# Patient Record
Sex: FEMALE | Race: WHITE | NOT HISPANIC OR LATINO | Employment: OTHER | URBAN - METROPOLITAN AREA
[De-identification: names, ages, dates, MRNs, and addresses within clinical notes are randomized per-mention and may not be internally consistent; named-entity substitution may affect disease eponyms.]

---

## 2017-01-18 ENCOUNTER — HOSPITAL ENCOUNTER (EMERGENCY)
Facility: MEDICAL CENTER | Age: 67
End: 2017-01-18
Attending: EMERGENCY MEDICINE
Payer: MEDICARE

## 2017-01-18 ENCOUNTER — APPOINTMENT (OUTPATIENT)
Dept: RADIOLOGY | Facility: MEDICAL CENTER | Age: 67
End: 2017-01-18
Attending: EMERGENCY MEDICINE
Payer: MEDICARE

## 2017-01-18 VITALS
RESPIRATION RATE: 16 BRPM | SYSTOLIC BLOOD PRESSURE: 129 MMHG | WEIGHT: 213.85 LBS | BODY MASS INDEX: 39.35 KG/M2 | OXYGEN SATURATION: 92 % | DIASTOLIC BLOOD PRESSURE: 84 MMHG | TEMPERATURE: 98.4 F | HEIGHT: 62 IN | HEART RATE: 92 BPM

## 2017-01-18 DIAGNOSIS — M25.462 EFFUSION OF LEFT KNEE: ICD-10-CM

## 2017-01-18 DIAGNOSIS — R60.9 DEPENDENT EDEMA: ICD-10-CM

## 2017-01-18 DIAGNOSIS — M17.12 PRIMARY OSTEOARTHRITIS OF LEFT KNEE: ICD-10-CM

## 2017-01-18 LAB — GLUCOSE BLD-MCNC: 182 MG/DL (ref 65–99)

## 2017-01-18 PROCEDURE — 99284 EMERGENCY DEPT VISIT MOD MDM: CPT

## 2017-01-18 PROCEDURE — A9270 NON-COVERED ITEM OR SERVICE: HCPCS | Performed by: EMERGENCY MEDICINE

## 2017-01-18 PROCEDURE — 700102 HCHG RX REV CODE 250 W/ 637 OVERRIDE(OP): Performed by: EMERGENCY MEDICINE

## 2017-01-18 PROCEDURE — 82962 GLUCOSE BLOOD TEST: CPT

## 2017-01-18 PROCEDURE — 94640 AIRWAY INHALATION TREATMENT: CPT

## 2017-01-18 PROCEDURE — 93971 EXTREMITY STUDY: CPT

## 2017-01-18 PROCEDURE — 73564 X-RAY EXAM KNEE 4 OR MORE: CPT | Mod: LT

## 2017-01-18 PROCEDURE — 700101 HCHG RX REV CODE 250: Performed by: EMERGENCY MEDICINE

## 2017-01-18 PROCEDURE — 93971 EXTREMITY STUDY: CPT | Mod: 26 | Performed by: SURGERY

## 2017-01-18 RX ORDER — OXYCODONE HYDROCHLORIDE AND ACETAMINOPHEN 5; 325 MG/1; MG/1
1-2 TABLET ORAL EVERY 6 HOURS PRN
Qty: 15 TAB | Refills: 0 | Status: SHIPPED | OUTPATIENT
Start: 2017-01-18 | End: 2017-02-10

## 2017-01-18 RX ORDER — OXYCODONE HYDROCHLORIDE AND ACETAMINOPHEN 5; 325 MG/1; MG/1
1 TABLET ORAL ONCE
Status: COMPLETED | OUTPATIENT
Start: 2017-01-18 | End: 2017-01-18

## 2017-01-18 RX ADMIN — ALBUTEROL SULFATE 2.5 MG: 2.5 SOLUTION RESPIRATORY (INHALATION) at 19:18

## 2017-01-18 RX ADMIN — OXYCODONE AND ACETAMINOPHEN 1 TABLET: 5; 325 TABLET ORAL at 17:15

## 2017-01-18 ASSESSMENT — LIFESTYLE VARIABLES
EVER_SMOKED: YES
DO YOU DRINK ALCOHOL: NO

## 2017-01-18 ASSESSMENT — PAIN SCALES - GENERAL: PAINLEVEL_OUTOF10: 7

## 2017-01-18 ASSESSMENT — COPD QUESTIONNAIRES
DURING THE PAST 4 WEEKS HOW MUCH DID YOU FEEL SHORT OF BREATH: NONE/LITTLE OF THE TIME
HAVE YOU SMOKED AT LEAST 100 CIGARETTES IN YOUR ENTIRE LIFE: YES
COPD SCREENING SCORE: 4
DO YOU EVER COUGH UP ANY MUCUS OR PHLEGM?: NO/ONLY WITH OCCASIONAL COLDS OR INFECTIONS

## 2017-01-18 NOTE — ED AVS SNAPSHOT
1/18/2017          Jessica Cueto  [address] [place] [address]    Dear Jessica:    [facility] wants to ensure your discharge home is safe and you or your loved ones have had all your questions answered regarding your care after you leave the hospital.    You may receive a telephone call within two days of your discharge.  This call is to make certain you understand your discharge instructions as well as ensure we provided you with the best care possible during your stay with us.     The call will only last approximately 3-5 minutes and will be done by a nurse.    Once again, we want to ensure your discharge home is safe and that you have a clear understanding of any next steps in your care.  If you have any questions or concerns, please do not hesitate to contact us, we are here for you.  Thank you for choosing [facility] for your healthcare needs.    Sincerely,    Bridger Childs    [facility]

## 2017-01-18 NOTE — ED NOTES
Pt said that she has not eaten since yesterday and concern about her blood sugar.  fsbs 182, no distress noted

## 2017-01-18 NOTE — ED NOTES
C/o L knee pain began yesterday afternoon, flew from [place] on Tuesday, spoke w her pmd  In [place] and told to come to ed for eval of dvt, history of ' small vessel disease from my knnes on down have had revascularization on R side'

## 2017-01-18 NOTE — ED AVS SNAPSHOT
[employer] Access Code: [email or web address]  Expires: 2/17/2017  9:36 PM    [employer]  A secure, online tool to manage your health information     [employer]’s [employer]® is a secure, online tool that connects you to your personalized health information from the privacy of your home -- day or night - making it very easy for you to manage your healthcare. Once the activation process is completed, you can even access your medical information using the [employer] lara, which is available for free in the Apple Lara store or Google Play store.     [employer] provides the following levels of access (as shown below):   My Chart Features   [facility] Primary Care Doctor [facility]  Specialists [facility]  Urgent  Care Non-[facility]  Primary Care  Doctor   Email your healthcare team securely and privately 24/7 X X X X   Manage appointments: schedule your next appointment; view details of past/upcoming appointments X      Request prescription refills. X      View recent personal medical records, including lab and immunizations X X X X   View health record, including health history, allergies, medications X X X X   Read reports about your outpatient visits, procedures, consult and ER notes X X X X   See your discharge summary, which is a recap of your hospital and/or ER visit that includes your diagnosis, lab results, and care plan. X X       How to register for [employer]:  1. Go to  [email or web address].  2. Click on the Sign Up Now box, which takes you to the New Member Sign Up page. You will need to provide the following information:  a. Enter your [employer] Access Code exactly as it appears at the top of this page. (You will not need to use this code after you’ve completed the sign-up process. If you do not sign up before the expiration date, you must request a new code.)   b. Enter your date of birth.   c. Enter your home email address.   d. Click Submit, and follow the next screen’s instructions.  3. Create a [employer] ID. This will be your [employer]  login ID and cannot be changed, so think of one that is secure and easy to remember.  4. Create a [employer] password. You can change your password at any time.  5. Enter your Password Reset Question and Answer. This can be used at a later time if you forget your password.   6. Enter your e-mail address. This allows you to receive e-mail notifications when new information is available in [employer].  7. Click Sign Up. You can now view your health information.    For assistance activating your [employer] account, call [phone]

## 2017-01-18 NOTE — ED AVS SNAPSHOT
After Visit Summary                                                                                                                Jessica Cueto   MRN: [id number]    Department:  [facility], Emergency Dept   Date of Visit:  1/18/2017            [facility], Emergency Dept    [address]    [place] [address]    Phone:  [phone]      You were seen by     Chadd Sexton M.D.      Your Diagnosis Was     Dependent edema     R60.9       These are the medications you received during your hospitalization from 01/18/2017 1151 to 01/18/2017 2136     Date/Time Order Dose Route Action    01/18/2017 1715 oxycodone-acetaminophen (PERCOCET) 5-325 MG per tablet 1 Tab 1 Tab Oral Given    01/18/2017 1918 albuterol (PROVENTIL) 2.5mg/0.5ml nebulizer solution 2.5 mg 2.5 mg Nebulization Given      Follow-up Information     1. Follow up with José Miguel Hester M.D..    Specialty:  Orthopaedics    Why:  As needed 1-2 weeks    Contact information    [address]  [address]  [facility] [id number] [phone]        Medication Information     Review all of your home medications and newly ordered medications with your primary doctor and/or pharmacist as soon as possible. Follow medication instructions as directed by your doctor and/or pharmacist.     Please keep your complete medication list with you and share with your physician. Update the information when medications are discontinued, doses are changed, or new medications (including over-the-counter products) are added; and carry medication information at all times in the event of emergency situations.               Medication List      ASK your doctor about these medications        Instructions    * albuterol 2.5mg/3ml Nebu solution for nebulization   Commonly known as:  PROVENTIL    Doctor's comments:  25 bullets   3 mL by Nebulization route every 6 hours as needed for Shortness of Breath.   Dose:  2.5 mg       * albuterol 108 (90 BASE) MCG/ACT  Aers inhalation aerosol    Inhale 2 Puffs by mouth every 6 hours as needed for Shortness of Breath.   Dose:  2 Puff       Blood Glucose Monitoring Suppl SUPPLIES Misc    Doctor's comments:  e11.65   Test strips order: Test strips for Accucheck  meter. Sig: use bid and prn ssx high or low sugar #100 RF x 3       Lancets [facility]    Doctor's comments:  e11.65   Lancets order: Lancets for Accucheck meter. Sig: use bid and prn ssx high or low sugar. #100 RF x 3       losartan 50 MG Tabs   Commonly known as:  COZAAR    Take 1 Tab by mouth every day.   Dose:  50 mg       metformin 500 MG Tabs   Commonly known as:  GLUCOPHAGE    Take 1 Tab by mouth 2 times a day, with meals.   Dose:  500 mg       * oxycodone-acetaminophen 5-325 MG Tabs   What changed:  Another medication with the same name was added. Make sure you understand how and when to take each.   Commonly known as:  PERCOCET   Ask about: Which instructions should I use?    Take 1 Tab by mouth every 6 hours as needed.   Dose:  1 Tab       * oxycodone-acetaminophen 5-325 MG Tabs   What changed:  Another medication with the same name was added. Make sure you understand how and when to take each.   Commonly known as:  PERCOCET   Ask about: Which instructions should I use?    Take 1-2 Tabs by mouth every 6 hours as needed.   Dose:  1-2 Tab       * oxycodone-acetaminophen 5-325 MG Tabs   What changed:  You were already taking a medication with the same name, and this prescription was added. Make sure you understand how and when to take each.   Commonly known as:  PERCOCET   Ask about: Which instructions should I use?    Take 1-2 Tabs by mouth every 6 hours as needed (moderate to severe pain).   Dose:  1-2 Tab       * Notice:  This list has 5 medication(s) that are the same as other medications prescribed for you. Read the directions carefully, and ask your doctor or other care provider to review them with you.            Procedures and tests performed during your visit      ACCU-CHEK GLUCOSE    DX-KNEE COMPLETE 4+ LEFT    LE VENOUS DUPLEX (Specify in Comments Left, Right Or Bilateral)        Discharge Instructions       Knee Sprain     Ice the knee 15 minutes four times daily for 2 days.    Take anti inflammatory regularly for 3-5 days (ibuprofen 600 mg every 6 hours or naproxen 400 mg BID) with Prilosec 20 mg a day to prevent gastritis and prescribed medicine as needed. Follow up with ortho if not much better in 1-2 weeks. Return to the ER for any redness, fever, increasing pain, shortness of breath, chest pain or ill appearance. Follow-up with your primary doctor in 1-2 weeks to recheck your oxygenation.    You have a knee sprain. Sprains are painful injuries to the joints. They are a partial or complete tearing of ligaments. Ligaments are tough, fibrous tissues that hold bones together at the joints. A strain (sprain) has occurred when a ligament is stretched or damaged. This injury may take several weeks to heal. This is often the same length of time as a bone fracture (break in bone) takes to heal. Even though a fracture (bone break) may not have occurred, the recovery times may be similar.     HOME CARE INSTRUCTIONS  Ø Rest the injured area as directed. Then slowly start using the joint as directed by your caregiver and as the pain allows. Use crutches as directed.  If the knee was splinted or casted, continue use and care as directed. If an ace bandage has been applied today, it should be removed and reapplied every 3 to 4 hours. It should not be applied tightly, but firmly enough to keep swelling down. Watch toes and feet for swelling, bluish discoloration, coldness, numbness or excessive pain. If any of these symptoms occur, remove the ace bandage and reapply more loosely. If these symptoms persist, seek medical attention.  Ø For the first 24 hours, lie down. Keep the injured extremity elevated on two pillows.  Ø Apply ice to the injured area for fifteen minutes every couple  hours. Do this while awake for the first half day.  Repeat this four times per day for the first 48 hours. Put the ice in a plastic bag and place a towel between the bag of ice and your skin.  Ø Do not apply heat for 48 hours after your injury. Early use of heat will increase swelling and pain. After 48 hours you may use warm packs or warm soaks for 15 to 20 minutes, 2 to 4 times per day. This will give pain relief. It will also help the injury heal faster. Do not sleep with a heating pad as it may cause burns. If you are diabetic, do not use a heating pad unless instructed to do so.  Ø Wear any splinting, casting, or elastic bandage applications as instructed.  Ø Only take over-the-counter or prescription medicines for pain, discomfort, or fever as directed by your caregiver. Do not use aspirin immediately after the injury unless instructed by your caregiver. Aspirin can cause increased bleeding and bruising of the tissues.  Ø If you were given crutches, continue to use them as instructed. Do not resume weight bearing on the affected extremity until instructed.     Persistent pain and inability to use the injured area as directed for more than 2 to 3 days are warning signs. If this happens you should see a caregiver for a follow-up visit as soon as possible. Initially, a hairline fracture (this is the same as a broken bone) may not be evident on x-rays. Persistent pain and swelling indicate that further evaluation, non-weight bearing (use of crutches as instructed), and/or further x-rays are indicated. X-rays may sometimes not show a small fracture until a week or ten days later. Make a follow-up appointment with your own caregiver or one to whom we have referred you. A radiologist (specialist in reading x-rays) may re-read your X-rays. Make sure you know how you are to get your x-ray results. Do not assume everything is normal if you do not hear from us.     SEEK MEDICAL CARE IF:  Ø Bruising, swelling, or pain  increases. Ø You have cold or numb toes.  Ø You have continuing difficulty or pain with walking.      SEEK IMMEDIATE MEDICAL CARE IF:  Ø Your toes are cold, numb or blue.  Ø The pain is not responding to medications and continues to stay the same or get worse.     AGREEMENT BETWEEN PATIENT AND HEALTHCARE TEAM:  Your signature on this document represents an understanding between you and the healthcare team that took care of you today.  That means that you:  Ø Understand these discharge instructions.   Ø Will monitor your condition.  Ø Will seek immediate medical care as instructed.     Document Released: 12/18/2006  Document Re-Released: 06/05/2009  ExitCare® Patient Information ©2009 [employer].            Patient Information     Patient Information    Following emergency treatment: all patient requiring follow-up care must return either to a private physician or a clinic if your condition worsens before you are able to obtain further medical attention, please return to the emergency room.     Billing Information    At [facility], we work to make the billing process streamlined for our patients.  Our Representatives are here to answer any questions you may have regarding your hospital bill.  If you have insurance coverage and have supplied your insurance information to us, we will submit a claim to your insurer on your behalf.  Should you have any questions regarding your bill, we can be reached online or by phone as follows:  Online: You are able pay your bills online or live chat with our representatives about any billing questions you may have. We are here to help Monday - Friday from 8:00am to 7:30pm and 9:00am - 12:00pm on Saturdays.  Please visit [email or web address]  for more information.   Phone:  [phone] or [phone]    Please note that your emergency physician, surgeon, pathologist, radiologist, anesthesiologist, and other specialists are not employed by  [facility] and will therefore bill separately for their services.  Please contact them directly for any questions concerning their bills at the numbers below:     Emergency Physician Services:  [phone]  [facility]:  [phone]  [facility]:  [phone]  [facility]:  [phone]    1. Your final bill may vary from the amount quoted upon discharge if all procedures are not complete at that time, or if your doctor has additional procedures of which we are not aware. You will receive an additional bill if you return to the Emergency Department at [facility] for suture removal regardless of the facility of which the sutures were placed.     2. Please arrange for settlement of this account at the emergency registration.    3. All self-pay accounts are due in full at the time of treatment.  If you are unable to meet this obligation then payment is expected within 4-5 days.     4. If you have had radiology studies (CT, X-ray, Ultrasound, MRI), you have received a preliminary result during your emergency department visit. Please contact the radiology department [phone] to receive a copy of your final result. Please discuss the Final result with your primary physician or with the follow up physician provided.     Crisis Hotline:  [place] Crisis Hotline:  2-973-WGIDMQL or 1-246.916.4586  [place] Crisis Hotline:    1-260.431.3861 or 406-328-5681         ED Discharge Follow Up Questions    1. In order to provide you with very good care, we would like to follow up with a phone call in the next few days.  May we have your permission to contact you?     YES /  NO    2. What is the best phone number to call you? (       )_____-__________    3. What is the best time to call you?      Morning  /  Afternoon  /  Evening                   Patient Signature:  ____________________________________________________________    Date:   ____________________________________________________________

## 2017-01-19 NOTE — ED NOTES
Severe left knee pain after lengthy airplane ride. Instructed to come to ED to be evaluated for DVT

## 2017-01-19 NOTE — ED PROVIDER NOTES
ED Provider Note    CHIEF COMPLAINT  Chief Complaint   Patient presents with   • Knee Pain       HPI  Jessica Cueto is a 66 y.o. female who presents for left leg swelling and pain around the knee onset after a flight from [place] Monday. No prior DVT or PE. Denies chest pain or shortness of breath. No trauma to the knee. She does have peripheral vascular disease in the right leg. History of diabetes. Her swelling is currently improved. Pain is moderately severe. Denies fever or flulike symptoms nausea or body aches. No history of gout. Patient was very active in [place]. She's not had chronic knee pain before.    REVIEW OF SYSTEMS  Pertinent positives include: Left leg pain and swelling after a flight, peripheral vascular disease.  Pertinent negatives include: Headache, chest pain, cough, shortness of breath, vomiting, diarrhea, abdominal pain.  10+ systems reviewed and negative.      PAST MEDICAL HISTORY  Past Medical History   Diagnosis Date   • Diabetes (CMS-HCC)    • Cancer (CMS-HCC) 1995     uterine cancer       FAMILY HISTORY  Family History   Problem Relation Age of Onset   • Diabetes Mother    • Heart Disease Mother    • Heart Disease Father    • Stroke Sister    • Diabetes Brother        SOCIAL HISTORY  Social History   Substance Use Topics   • Smoking status: Former Smoker -- 10 years     Quit date: 01/15/1992   • Smokeless tobacco: Never Used   • Alcohol Use: No     History   Drug Use No       SURGICAL HISTORY  Past Surgical History   Procedure Laterality Date   • Tonsillectomy and adenoidectomy     • Appendectomy     • Cholecystectomy     • Abdominal hysterectomy total     • Stent placement     • Debridement         CURRENT MEDICATIONS  Home Medications     Reviewed by Salomón Fisher R.N. (Registered Nurse) on 01/18/17 at 1642  Med List Status: Partial    Medication Last Dose Status    albuterol (PROVENTIL) 2.5mg/3ml Nebu Soln solution for nebulization Unknown Active    albuterol (VENTOLIN OR  "PROVENTIL) 108 (90 BASE) MCG/ACT Aero Soln inhalation aerosol Unknown Active    Blood Glucose Monitoring Suppl SUPPLIES Misc  Active    Lancets Misc  Active    losartan (COZAAR) 50 MG Tab  Active    metformin (GLUCOPHAGE) 500 MG Tab  Active    oxycodone-acetaminophen (PERCOCET) 5-325 MG Tab has rx at pharmacy Active    oxycodone-acetaminophen (PERCOCET) 5-325 MG Tab  Active                ALLERGIES  Allergies   Allergen Reactions   • Pcn [Penicillins] Anaphylaxis   • Rocephin [Ceftriaxone Sodium-Lidocaine] Anaphylaxis       PHYSICAL EXAM  VITAL SIGNS: /63 mmHg  Pulse 83  Temp(Src) 36.3 °C (97.3 °F)  Resp 18  Ht 1.575 m (5' 2.01\")  Wt 97 kg (213 lb 13.5 oz)  BMI 39.10 kg/m2  SpO2 97%  Reviewed and elevated blood pressure, no hypoxia on room air  Constitutional: Well developed, Well nourished, moderately obese.  HENT: Normocephalic, atraumatic, bilateral external ears normal, oropharynx moist, No exudates or erythema.   Eyes: PERRLA, conjunctiva pink, no scleral icterus.   Cardiovascular: Regular S1-S2 without murmur, rub, gallop. 2+ pitting edema left leg without calf cords or tenderness..  Respiratory: No rales, rhonchi, wheeze.  Gastrointestinal: Soft, nontender, nondistended.  Skin: No erythema, no rash.   Genitourinary:  No costovertebral angle tenderness.   Neurologic: Alert & oriented x 3, cranial nerves 2-12 intact by passive exam.  No focal deficit noted.  Psychiatric: Affect normal, Judgment normal, Mood normal.   Musculoskeletal: Generalized tenderness left knee without erythema or warmth. Patellar and medial and lateral joint line tenderness. Positive effusion.    DIFFERENTIAL DIAGNOSIS:  DVT, arthritis, gout, septic joint, occult fracture.      RADIOLOGY/PROCEDURES  DX-KNEE COMPLETE 4+ LEFT   Final Result         1. Severe tricompartmental osteoarthritis with large knee joint effusion.      LE VENOUS DUPLEX (Specify in Comments Left, Right Or Bilateral)   Final Result    "       LABORATORY:  Results for orders placed or performed during the hospital encounter of 01/18/17   ACCU-CHEK GLUCOSE   Result Value Ref Range    Glucose - Accu-Ck 182 (H) 65 - 99 mg/dL       INTERVENTIONS:  Medications   oxycodone-acetaminophen (PERCOCET) 5-325 MG per tablet 1 Tab (1 Tab Oral Given 1/18/17 1715)   albuterol (PROVENTIL) 2.5mg/0.5ml nebulizer solution 2.5 mg (2.5 mg Nebulization Given 1/18/17 1918)     Response: Patient developed borderline hypoxia which improved to 92-93% after albuterol. History of asthma on albuterol. Minimal improvement in pain.    COURSE & MEDICAL DECISION MAKING  This patient presents with left leg pain specifically in the left knee with effusion and dependent edema. There is no DVT despite her recent flight. She has severe osteoarthritis with an effusion likely due to her recent increase in use. She has no history of gout and clinically no symptoms or exam findings suggestive of septic joint. At this point I do not believe arthrocentesis would change treatment. Patient also had borderline hypoxia and a history of asthma but no suggestive of pneumonia or respiratory infection. She is not wheezing at this time. Her oxygenation improved with albuterol..    PLAN:  Discharge Medication List as of 1/18/2017  9:36 PM      START taking these medications    Details   !! oxycodone-acetaminophen (PERCOCET) 5-325 MG Tab Take 1-2 Tabs by mouth every 6 hours as needed (moderate to severe pain)., Disp-15 Tab, R-0, Print Rx Paper       !! - Potential duplicate medications found. Please discuss with provider.        Prescription monitoring accessed  Return for fever or redness or ill appearance  Take albuterol twice a day  Follow-up primary physician 2 weeks for reassessment of oxygenation    José Miguel Hester M.D.  [address]  [address]  [facility] [address]  [phone]      As needed 1-2 weeks      CONDITION: Stable.    FINAL IMPRESSION  1. Dependent edema    2. Effusion of left knee    3.  Primary osteoarthritis of left knee    4.      History of asthma with mild hypoxia, improved      Electronically signed by: Chadd Sexton, 1/18/2017 4:45 PM

## 2017-01-19 NOTE — ED NOTES
Pulse ox moved to ear due to patient having acrylic nails. Patient O2 stat at 88% on room air after breathing treatment from RT. 2L applied via NC, patient O2 stat slowly climbing into the mid 90s

## 2017-01-19 NOTE — DISCHARGE INSTRUCTIONS
Knee Sprain     Ice the knee 15 minutes four times daily for 2 days.    Take anti inflammatory regularly for 3-5 days (ibuprofen 600 mg every 6 hours or naproxen 400 mg BID) with Prilosec 20 mg a day to prevent gastritis and prescribed medicine as needed. Follow up with ortho if not much better in 1-2 weeks. Return to the ER for any redness, fever, increasing pain, shortness of breath, chest pain or ill appearance. Follow-up with your primary doctor in 1-2 weeks to recheck your oxygenation.    You have a knee sprain. Sprains are painful injuries to the joints. They are a partial or complete tearing of ligaments. Ligaments are tough, fibrous tissues that hold bones together at the joints. A strain (sprain) has occurred when a ligament is stretched or damaged. This injury may take several weeks to heal. This is often the same length of time as a bone fracture (break in bone) takes to heal. Even though a fracture (bone break) may not have occurred, the recovery times may be similar.     HOME CARE INSTRUCTIONS  Ø Rest the injured area as directed. Then slowly start using the joint as directed by your caregiver and as the pain allows. Use crutches as directed.  If the knee was splinted or casted, continue use and care as directed. If an ace bandage has been applied today, it should be removed and reapplied every 3 to 4 hours. It should not be applied tightly, but firmly enough to keep swelling down. Watch toes and feet for swelling, bluish discoloration, coldness, numbness or excessive pain. If any of these symptoms occur, remove the ace bandage and reapply more loosely. If these symptoms persist, seek medical attention.  Ø For the first 24 hours, lie down. Keep the injured extremity elevated on two pillows.  Ø Apply ice to the injured area for fifteen minutes every couple hours. Do this while awake for the first half day.  Repeat this four times per day for the first 48 hours. Put the ice in a plastic bag and place a  towel between the bag of ice and your skin.  Ø Do not apply heat for 48 hours after your injury. Early use of heat will increase swelling and pain. After 48 hours you may use warm packs or warm soaks for 15 to 20 minutes, 2 to 4 times per day. This will give pain relief. It will also help the injury heal faster. Do not sleep with a heating pad as it may cause burns. If you are diabetic, do not use a heating pad unless instructed to do so.  Ø Wear any splinting, casting, or elastic bandage applications as instructed.  Ø Only take over-the-counter or prescription medicines for pain, discomfort, or fever as directed by your caregiver. Do not use aspirin immediately after the injury unless instructed by your caregiver. Aspirin can cause increased bleeding and bruising of the tissues.  Ø If you were given crutches, continue to use them as instructed. Do not resume weight bearing on the affected extremity until instructed.     Persistent pain and inability to use the injured area as directed for more than 2 to 3 days are warning signs. If this happens you should see a caregiver for a follow-up visit as soon as possible. Initially, a hairline fracture (this is the same as a broken bone) may not be evident on x-rays. Persistent pain and swelling indicate that further evaluation, non-weight bearing (use of crutches as instructed), and/or further x-rays are indicated. X-rays may sometimes not show a small fracture until a week or ten days later. Make a follow-up appointment with your own caregiver or one to whom we have referred you. A radiologist (specialist in reading x-rays) may re-read your X-rays. Make sure you know how you are to get your x-ray results. Do not assume everything is normal if you do not hear from us.     SEEK MEDICAL CARE IF:  Ø Bruising, swelling, or pain increases. Ø You have cold or numb toes.  Ø You have continuing difficulty or pain with walking.      SEEK IMMEDIATE MEDICAL CARE IF:  Ø Your toes are  cold, numb or blue.  Ø The pain is not responding to medications and continues to stay the same or get worse.     AGREEMENT BETWEEN PATIENT AND HEALTHCARE TEAM:  Your signature on this document represents an understanding between you and the healthcare team that took care of you today.  That means that you:  Ø Understand these discharge instructions.   Ø Will monitor your condition.  Ø Will seek immediate medical care as instructed.     Document Released: 12/18/2006  Document Re-Released: 06/05/2009  [employer]® Patient Information ©2009 [employer].

## 2017-01-19 NOTE — ED NOTES
Jessica JUSTIN Nory discharged via ambulation with daughter driving home.  Discharge instructions given and reviewed, patient educated to follow up with ED and/or ortho if worsening, verbalized understanding.  Prescriptions given x 1.  All personal belongings in possession.  No questions at this time.

## 2017-02-10 ENCOUNTER — APPOINTMENT (OUTPATIENT)
Dept: MEDICAL GROUP | Facility: MEDICAL CENTER | Age: 67
End: 2017-02-10
Payer: MEDICARE

## 2017-02-10 ENCOUNTER — APPOINTMENT (OUTPATIENT)
Dept: RADIOLOGY | Facility: MEDICAL CENTER | Age: 67
End: 2017-02-10
Attending: EMERGENCY MEDICINE
Payer: MEDICARE

## 2017-02-10 ENCOUNTER — HOSPITAL ENCOUNTER (EMERGENCY)
Facility: MEDICAL CENTER | Age: 67
End: 2017-02-10
Attending: EMERGENCY MEDICINE
Payer: MEDICARE

## 2017-02-10 VITALS
TEMPERATURE: 100 F | BODY MASS INDEX: 38.46 KG/M2 | OXYGEN SATURATION: 92 % | HEART RATE: 86 BPM | DIASTOLIC BLOOD PRESSURE: 81 MMHG | HEIGHT: 62 IN | WEIGHT: 209 LBS | SYSTOLIC BLOOD PRESSURE: 149 MMHG | RESPIRATION RATE: 16 BRPM

## 2017-02-10 DIAGNOSIS — J40 BRONCHITIS: ICD-10-CM

## 2017-02-10 DIAGNOSIS — E11.9 TYPE 2 DIABETES MELLITUS WITHOUT COMPLICATION, WITHOUT LONG-TERM CURRENT USE OF INSULIN (HCC): ICD-10-CM

## 2017-02-10 LAB
ALBUMIN SERPL BCP-MCNC: 4 G/DL (ref 3.2–4.9)
ALBUMIN/GLOB SERPL: 1.1 G/DL
ALP SERPL-CCNC: 77 U/L (ref 30–99)
ALT SERPL-CCNC: 12 U/L (ref 2–50)
ANION GAP SERPL CALC-SCNC: 11 MMOL/L (ref 0–11.9)
AST SERPL-CCNC: 15 U/L (ref 12–45)
BASOPHILS # BLD AUTO: 0.7 % (ref 0–1.8)
BASOPHILS # BLD: 0.05 K/UL (ref 0–0.12)
BILIRUB SERPL-MCNC: 1.8 MG/DL (ref 0.1–1.5)
BNP SERPL-MCNC: 40 PG/ML (ref 0–100)
BUN SERPL-MCNC: 18 MG/DL (ref 8–22)
CALCIUM SERPL-MCNC: 9 MG/DL (ref 8.5–10.5)
CHLORIDE SERPL-SCNC: 104 MMOL/L (ref 96–112)
CO2 SERPL-SCNC: 23 MMOL/L (ref 20–33)
CREAT SERPL-MCNC: 0.99 MG/DL (ref 0.5–1.4)
EOSINOPHIL # BLD AUTO: 0.04 K/UL (ref 0–0.51)
EOSINOPHIL NFR BLD: 0.6 % (ref 0–6.9)
ERYTHROCYTE [DISTWIDTH] IN BLOOD BY AUTOMATED COUNT: 42.5 FL (ref 35.9–50)
GFR SERPL CREATININE-BSD FRML MDRD: 56 ML/MIN/1.73 M 2
GLOBULIN SER CALC-MCNC: 3.5 G/DL (ref 1.9–3.5)
GLUCOSE BLD-MCNC: 165 MG/DL (ref 65–99)
GLUCOSE SERPL-MCNC: 180 MG/DL (ref 65–99)
HCT VFR BLD AUTO: 46.6 % (ref 37–47)
HGB BLD-MCNC: 15.2 G/DL (ref 12–16)
IMM GRANULOCYTES # BLD AUTO: 0.03 K/UL (ref 0–0.11)
IMM GRANULOCYTES NFR BLD AUTO: 0.4 % (ref 0–0.9)
LYMPHOCYTES # BLD AUTO: 0.85 K/UL (ref 1–4.8)
LYMPHOCYTES NFR BLD: 12.6 % (ref 22–41)
MCH RBC QN AUTO: 29.7 PG (ref 27–33)
MCHC RBC AUTO-ENTMCNC: 32.6 G/DL (ref 33.6–35)
MCV RBC AUTO: 91 FL (ref 81.4–97.8)
MONOCYTES # BLD AUTO: 0.49 K/UL (ref 0–0.85)
MONOCYTES NFR BLD AUTO: 7.3 % (ref 0–13.4)
NEUTROPHILS # BLD AUTO: 5.26 K/UL (ref 2–7.15)
NEUTROPHILS NFR BLD: 78.4 % (ref 44–72)
NRBC # BLD AUTO: 0 K/UL
NRBC BLD AUTO-RTO: 0 /100 WBC
PLATELET # BLD AUTO: 250 K/UL (ref 164–446)
PMV BLD AUTO: 12.2 FL (ref 9–12.9)
POTASSIUM SERPL-SCNC: 3.9 MMOL/L (ref 3.6–5.5)
PROT SERPL-MCNC: 7.5 G/DL (ref 6–8.2)
RBC # BLD AUTO: 5.12 M/UL (ref 4.2–5.4)
SODIUM SERPL-SCNC: 138 MMOL/L (ref 135–145)
WBC # BLD AUTO: 6.7 K/UL (ref 4.8–10.8)

## 2017-02-10 PROCEDURE — 80053 COMPREHEN METABOLIC PANEL: CPT

## 2017-02-10 PROCEDURE — 99284 EMERGENCY DEPT VISIT MOD MDM: CPT

## 2017-02-10 PROCEDURE — 83880 ASSAY OF NATRIURETIC PEPTIDE: CPT

## 2017-02-10 PROCEDURE — 87040 BLOOD CULTURE FOR BACTERIA: CPT

## 2017-02-10 PROCEDURE — 71010 DX-CHEST-LIMITED (1 VIEW): CPT

## 2017-02-10 PROCEDURE — 87077 CULTURE AEROBIC IDENTIFY: CPT

## 2017-02-10 PROCEDURE — 85025 COMPLETE CBC W/AUTO DIFF WBC: CPT

## 2017-02-10 PROCEDURE — 82962 GLUCOSE BLOOD TEST: CPT

## 2017-02-10 RX ORDER — ALBUTEROL SULFATE 2.5 MG/3ML
2.5 SOLUTION RESPIRATORY (INHALATION) EVERY 4 HOURS PRN
Qty: 75 ML | Refills: 0 | Status: SHIPPED | OUTPATIENT
Start: 2017-02-10 | End: 2017-10-02

## 2017-02-10 RX ORDER — BENZONATATE 100 MG/1
200 CAPSULE ORAL 3 TIMES DAILY PRN
Qty: 60 CAP | Refills: 0 | Status: SHIPPED | OUTPATIENT
Start: 2017-02-10 | End: 2017-09-26

## 2017-02-10 RX ORDER — AZITHROMYCIN 250 MG/1
TABLET, FILM COATED ORAL
Qty: 6 TAB | Refills: 0 | Status: SHIPPED | OUTPATIENT
Start: 2017-02-10 | End: 2017-07-18

## 2017-02-10 ASSESSMENT — PAIN SCALES - GENERAL: PAINLEVEL_OUTOF10: 3

## 2017-02-10 ASSESSMENT — LIFESTYLE VARIABLES: DO YOU DRINK ALCOHOL: NO

## 2017-02-10 NOTE — ED AVS SNAPSHOT
Home Care Instructions                                                                                                                Jessica Cueto   MRN: [id number]    Department:  [facility], Emergency Dept   Date of Visit:  2/10/2017            [facility], Emergency Dept    [address]    [facility] [address]    Phone:  [phone]      You were seen by     Guy G Gansert, M.D.      Your Diagnosis Was     Bronchitis     J40       Follow-up Information     1. Follow up with Don Chatterjee M.D. In 2 days.    Specialty:  Internal Medicine    Contact information    [address]  [address]  [facility] [address] [phone]        Medication Information     Review all of your home medications and newly ordered medications with your primary doctor and/or pharmacist as soon as possible. Follow medication instructions as directed by your doctor and/or pharmacist.     Please keep your complete medication list with you and share with your physician. Update the information when medications are discontinued, doses are changed, or new medications (including over-the-counter products) are added; and carry medication information at all times in the event of emergency situations.               Medication List      START taking these medications        Instructions    azithromycin 250 MG Tabs   Commonly known as:  ZITHROMAX    Take two tabs by mouth on day one, then one tab by mouth daily on days 2-5.       Hydrocod Polst-CPM Polst ER 10-8 MG/5ML Suer   Commonly known as:  TUSSIONEX PENNKINETIC ER    Take 5 mL by mouth every 12 hours as needed for Cough.   Dose:  5 mL         ASK your doctor about these medications        Instructions    * albuterol 2.5mg/3ml Nebu solution for nebulization   What changed:  Another medication with the same name was added. Make sure you understand how and when to take each.   Commonly known as:  PROVENTIL   Ask about: Which instructions should I use?    Doctor's comments:  25 bullets   3 mL by Nebulization route every 6 hours as needed for Shortness of Breath.   Dose:  2.5 mg       * albuterol 108 (90 BASE) MCG/ACT Aers inhalation aerosol   What changed:  Another medication with the same name was added. Make sure you understand how and when to take each.   Ask about: Which instructions should I use?    Inhale 2 Puffs by mouth every 6 hours as needed for Shortness of Breath.   Dose:  2 Puff       * albuterol 2.5mg/3ml Nebu solution for nebulization   What changed:  You were already taking a medication with the same name, and this prescription was added. Make sure you understand how and when to take each.   Commonly known as:  PROVENTIL   Ask about: Which instructions should I use?    3 mL by Nebulization route every four hours as needed for Shortness of Breath.   Dose:  2.5 mg       Blood Glucose Monitoring Suppl SUPPLIES Misc    Doctor's comments:  e11.65   Test strips order: Test strips for Accucheck  meter. Sig: use bid and prn ssx high or low sugar #100 RF x 3       Lancets [facility]    Doctor's comments:  e11.65   Lancets order: Lancets for Accucheck meter. Sig: use bid and prn ssx high or low sugar. #100 RF x 3       losartan 50 MG Tabs   Commonly known as:  COZAAR    Take 1 Tab by mouth every day.   Dose:  50 mg       metformin 500 MG Tabs   Commonly known as:  GLUCOPHAGE    Take 1 Tab by mouth 2 times a day, with meals.   Dose:  500 mg       * Notice:  This list has 3 medication(s) that are the same as other medications prescribed for you. Read the directions carefully, and ask your doctor or other care provider to review them with you.            Procedures and tests performed during your visit     ACCU-CHEK GLUCOSE    BLOOD CULTURE    BTYPE NATRIURETIC PEPTIDE    CBC WITH DIFFERENTIAL    COMP METABOLIC PANEL    DX-CHEST-LIMITED (1 VIEW)    ESTIMATED GFR        Discharge Instructions       Bronchitis  Bronchitis is a problem of the air tubes leading to your  lungs. This problem makes it hard for air to get in and out of the lungs. You may cough a lot because your air tubes are narrow. Going without care can cause lasting (chronic) bronchitis.  HOME CARE   · Drink enough fluids to keep your pee (urine) clear or pale yellow.  · Use a cool mist humidifier.  · Quit smoking if you smoke. If you keep smoking, the bronchitis might not get better.  · Only take medicine as told by your doctor.  GET HELP RIGHT AWAY IF:   · Coughing keeps you awake.  · You start to wheeze.  · You become more sick or weak.  · You have a hard time breathing or get short of breath.  · You cough up blood.  · Coughing lasts more than 2 weeks.  · You have a fever.  · Your baby is older than 3 months with a rectal temperature of 102° F (38.9° C) or higher.  · Your baby is 3 months old or younger with a rectal temperature of 100.4° F (38° C) or higher.  MAKE SURE YOU:  · Understand these instructions.  · Will watch your condition.  · Will get help right away if you are not doing well or get worse.  Document Released: 06/05/2009 Document Revised: 03/11/2013 Document Reviewed: 11/19/2010  [employer]® Patient Information ©2014 [employer].    Blood Glucose Monitoring, Adult  Monitoring your blood glucose (also know as blood sugar) helps you to manage your diabetes. It also helps you and your health care provider monitor your diabetes and determine how well your treatment plan is working.  WHY SHOULD YOU MONITOR YOUR BLOOD GLUCOSE?  · It can help you understand how food, exercise, and medicine affect your blood glucose.  · It allows you to know what your blood glucose is at any given moment. You can quickly tell if you are having low blood glucose (hypoglycemia) or high blood glucose (hyperglycemia).  · It can help you and your health care provider know how to adjust your medicines.  · It can help you understand how to manage an illness or adjust medicine for exercise.  WHEN SHOULD YOU TEST?  Your health care  provider will help you decide how often you should check your blood glucose. This may depend on the type of diabetes you have, your diabetes control, or the types of medicines you are taking. Be sure to write down all of your blood glucose readings so that this information can be reviewed with your health care provider. See below for examples of testing times that your health care provider may suggest.  Type 1 Diabetes  · Test at least 2 times per day if your diabetes is well controlled, if you are using an insulin pump, or if you perform multiple daily injections.  · If your diabetes is not well controlled or if you are sick, you may need to test more often.  · It is a good idea to also test:  ¨ Before every insulin injection.  ¨ Before and after exercise.  ¨ Between meals and 2 hours after a meal.  ¨ Occasionally between 2:00 a.m. and 3:00 a.m.  Type 2 Diabetes  · If you are taking insulin, test at least 2 times per day. However, it is best to test before every insulin injection.  · If you take medicines by mouth (orally), test 2 times a day.  · If you are on a controlled diet, test once a day.  · If your diabetes is not well controlled or if you are sick, you may need to monitor more often.  HOW TO MONITOR YOUR BLOOD GLUCOSE  Supplies Needed  · Blood glucose meter.  · Test strips for your meter. Each meter has its own strips. You must use the strips that go with your own meter.  · A pricking needle (lancet).  · A device that holds the lancet (lancing device).  · A journal or log book to write down your results.  Procedure  · Wash your hands with soap and water. Alcohol is not preferred.  · Prick the side of your finger (not the tip) with the lancet.  · Gently milk the finger until a small drop of blood appears.  · Follow the instructions that come with your meter for inserting the test strip, applying blood to the strip, and using your blood glucose meter.  Other Areas to Get Blood for Testing  Some meters allow  "you to use other areas of your body (other than your finger) to test your blood. These areas are called alternative sites. The most common alternative sites are:  · The forearm.  · The thigh.  · The back area of the lower leg.  · The palm of the hand.  The blood flow in these areas is slower. Therefore, the blood glucose values you get may be delayed, and the numbers are different from what you would get from your fingers. Do not use alternative sites if you think you are having hypoglycemia. Your reading will not be accurate. Always use a finger if you are having hypoglycemia. Also, if you cannot feel your lows (hypoglycemia unawareness), always use your fingers for your blood glucose checks.  ADDITIONAL TIPS FOR GLUCOSE MONITORING  · Do not reuse lancets.  · Always carry your supplies with you.  · All blood glucose meters have a 24-hour \"hotline\" number to call if you have questions or need help.  · Adjust (calibrate) your blood glucose meter with a control solution after finishing a few boxes of strips.  BLOOD GLUCOSE RECORD KEEPING  It is a good idea to keep a daily record or log of your blood glucose readings. Most glucose meters, if not all, keep your glucose records stored in the meter. Some meters come with the ability to download your records to your home computer. Keeping a record of your blood glucose readings is especially helpful if you are wanting to look for patterns. Make notes to go along with the blood glucose readings because you might forget what happened at that exact time. Keeping good records helps you and your health care provider to work together to achieve good diabetes management.      This information is not intended to replace advice given to you by your health care provider. Make sure you discuss any questions you have with your health care provider.     Document Released: 12/20/2004 Document Revised: 01/08/2016 Document Reviewed: 05/12/2014  Elsevier Interactive Patient Education ©2016 " Elsevier Inc.  Bronchospasm, Adult  A bronchospasm is a spasm or tightening of the airways going into the lungs. During a bronchospasm breathing becomes more difficult because the airways get smaller. When this happens there can be coughing, a whistling sound when breathing (wheezing), and difficulty breathing. Bronchospasm is often associated with asthma, but not all patients who experience a bronchospasm have asthma.  CAUSES   A bronchospasm is caused by inflammation or irritation of the airways. The inflammation or irritation may be triggered by:   · Allergies (such as to animals, pollen, food, or mold). Allergens that cause bronchospasm may cause wheezing immediately after exposure or many hours later.    · Infection. Viral infections are believed to be the most common cause of bronchospasm.    · Exercise.    · Irritants (such as pollution, cigarette smoke, strong odors, aerosol sprays, and paint fumes).    · Weather changes. Winds increase molds and pollens in the air. Rain refreshes the air by washing irritants out. Cold air may cause inflammation.    · Stress and emotional upset.    SIGNS AND SYMPTOMS   · Wheezing.    · Excessive nighttime coughing.    · Frequent or severe coughing with a simple cold.    · Chest tightness.    · Shortness of breath.    DIAGNOSIS   Bronchospasm is usually diagnosed through a history and physical exam. Tests, such as chest X-rays, are sometimes done to look for other conditions.  TREATMENT   · Inhaled medicines can be given to open up your airways and help you breathe. The medicines can be given using either an inhaler or a nebulizer machine.  · Corticosteroid medicines may be given for severe bronchospasm, usually when it is associated with asthma.  HOME CARE INSTRUCTIONS   · Always have a plan prepared for seeking medical care. Know when to call your health care provider and local emergency services (911 in the [place] Know where you can access local emergency care.  · Only  take medicines as directed by your health care provider.  · If you were prescribed an inhaler or nebulizer machine, ask your health care provider to explain how to use it correctly. Always use a spacer with your inhaler if you were given one.  · It is necessary to remain calm during an attack. Try to relax and breathe more slowly.   · Control your home environment in the following ways:    · Change your heating and air conditioning filter at least once a month.    · Limit your use of fireplaces and wood stoves.  · Do not smoke and do not allow smoking in your home.    · Avoid exposure to perfumes and fragrances.    · Get rid of pests (such as roaches and mice) and their droppings.    · Throw away plants if you see mold on them.    · Keep your house clean and dust free.    · Replace carpet with wood, tile, or vinyl bibi. Carpet can trap dander and dust.    · Use allergy-proof pillows, mattress covers, and box spring covers.    · Wash bed sheets and blankets every week in hot water and dry them in a dryer.    · Use blankets that are made of polyester or cotton.    · Wash hands frequently.  SEEK MEDICAL CARE IF:   · You have muscle aches.    · You have chest pain.    · The sputum changes from clear or white to yellow, green, gray, or bloody.    · The sputum you cough up gets thicker.    · There are problems that may be related to the medicine you are given, such as a rash, itching, swelling, or trouble breathing.    SEEK IMMEDIATE MEDICAL CARE IF:   · You have worsening wheezing and coughing even after taking your prescribed medicines.    · You have increased difficulty breathing.    · You develop severe chest pain.  MAKE SURE YOU:   · Understand these instructions.  · Will watch your condition.  · Will get help right away if you are not doing well or get worse.     This information is not intended to replace advice given to you by your health care provider. Make sure you discuss any questions you have with your  health care provider.     Document Released: 12/20/2004 Document Revised: 01/08/2016 Document Reviewed: 06/09/2014  Elsevier Interactive Patient Education ©2016 Elsevier Inc.            Patient Information     Patient Information    Following emergency treatment: all patient requiring follow-up care must return either to a private physician or a clinic if your condition worsens before you are able to obtain further medical attention, please return to the emergency room.     Billing Information    At [facility], we work to make the billing process streamlined for our patients.  Our Representatives are here to answer any questions you may have regarding your hospital bill.  If you have insurance coverage and have supplied your insurance information to us, we will submit a claim to your insurer on your behalf.  Should you have any questions regarding your bill, we can be reached online or by phone as follows:  Online: You are able pay your bills online or live chat with our representatives about any billing questions you may have. We are here to help Monday - Friday from 8:00am to 7:30pm and 9:00am - 12:00pm on Saturdays.  Please visit [email or web address]  for more information.   Phone:  [phone] or [phone]    Please note that your emergency physician, surgeon, pathologist, radiologist, anesthesiologist, and other specialists are not employed by [facility] and will therefore bill separately for their services.  Please contact them directly for any questions concerning their bills at the numbers below:     Emergency Physician Services:  [phone]  [facility]:  [phone]  [facility]:  [phone]  [facility]:  [phone]    1. Your final bill may vary from the amount quoted upon discharge if all procedures are not complete at that time, or if your doctor has additional procedures of which we are not aware. You will  receive an additional bill if you return to the Emergency Department at [facility] for suture removal regardless of the facility of which the sutures were placed.     2. Please arrange for settlement of this account at the emergency registration.    3. All self-pay accounts are due in full at the time of treatment.  If you are unable to meet this obligation then payment is expected within 4-5 days.     4. If you have had radiology studies (CT, X-ray, Ultrasound, MRI), you have received a preliminary result during your emergency department visit. Please contact the radiology department [phone] to receive a copy of your final result. Please discuss the Final result with your primary physician or with the follow up physician provided.     Crisis Hotline:  [place] Crisis Hotline:  3-610-OYVAKRK or 1-519.254.7192  [place] Crisis Hotline:    1-738.732.4465 or 303-185-0658         ED Discharge Follow Up Questions    1. In order to provide you with very good care, we would like to follow up with a phone call in the next few days.  May we have your permission to contact you?     YES /  NO    2. What is the best phone number to call you? (       )_____-__________    3. What is the best time to call you?      Morning  /  Afternoon  /  Evening                   Patient Signature:  ____________________________________________________________    Date:  ____________________________________________________________

## 2017-02-10 NOTE — ED AVS SNAPSHOT
TravelMuse Access Code: [email or web address]  Expires: 2/17/2017  9:36 PM    TravelMuse  A secure, online tool to manage your health information     [employer]’s TravelMuse® is a secure, online tool that connects you to your personalized health information from the privacy of your home -- day or night - making it very easy for you to manage your healthcare. Once the activation process is completed, you can even access your medical information using the TravelMuse lara, which is available for free in the Apple Lara store or Google Play store.     TravelMuse provides the following levels of access (as shown below):   My Chart Features   [facility] Primary Care Doctor [facility]  Specialists [facility]  Urgent  Care Non-[facility]  Primary Care  Doctor   Email your healthcare team securely and privately 24/7 X X X X   Manage appointments: schedule your next appointment; view details of past/upcoming appointments X      Request prescription refills. X      View recent personal medical records, including lab and immunizations X X X X   View health record, including health history, allergies, medications X X X X   Read reports about your outpatient visits, procedures, consult and ER notes X X X X   See your discharge summary, which is a recap of your hospital and/or ER visit that includes your diagnosis, lab results, and care plan. X X       How to register for TravelMuse:  1. Go to  [email or web address].  2. Click on the Sign Up Now box, which takes you to the New Member Sign Up page. You will need to provide the following information:  a. Enter your TravelMuse Access Code exactly as it appears at the top of this page. (You will not need to use this code after you’ve completed the sign-up process. If you do not sign up before the expiration date, you must request a new code.)   b. Enter your date of birth.   c. Enter your home email address.   d. Click Submit, and follow the next screen’s instructions.  3. Create a TravelMuse ID. This will be your TravelMuse  login ID and cannot be changed, so think of one that is secure and easy to remember.  4. Create a [employer] password. You can change your password at any time.  5. Enter your Password Reset Question and Answer. This can be used at a later time if you forget your password.   6. Enter your e-mail address. This allows you to receive e-mail notifications when new information is available in [employer].  7. Click Sign Up. You can now view your health information.    For assistance activating your [employer] account, call [phone]

## 2017-02-10 NOTE — ED AVS SNAPSHOT
2/10/2017          Jessica Cueto  [address] [place] [address]    Dear Jessica:    [facility] wants to ensure your discharge home is safe and you or your loved ones have had all your questions answered regarding your care after you leave the hospital.    You may receive a telephone call within two days of your discharge.  This call is to make certain you understand your discharge instructions as well as ensure we provided you with the best care possible during your stay with us.     The call will only last approximately 3-5 minutes and will be done by a nurse.    Once again, we want to ensure your discharge home is safe and that you have a clear understanding of any next steps in your care.  If you have any questions or concerns, please do not hesitate to contact us, we are here for you.  Thank you for choosing [facility] for your healthcare needs.    Sincerely,    Bridger Childs    [facility]

## 2017-02-11 ENCOUNTER — PATIENT OUTREACH (OUTPATIENT)
Dept: HEALTH INFORMATION MANAGEMENT | Facility: OTHER | Age: 67
End: 2017-02-11

## 2017-02-11 NOTE — ED NOTES
Pt to triage in WC w/ fmaily.  Chief Complaint   Patient presents with   • Cough   • Headache   • Rib Pain   • Sore Throat   • Shortness of Breath     Symptoms x1d. Pt reports she flew home from [place] yesterday. Pt speaking in complete sentences.  Pt asked to wait in [facility]. Advised of wait time and triage process. Advised to alert RN w/ any changes in condition. Thanked for patience.

## 2017-02-11 NOTE — ED NOTES
Discharge instructions given to patient. Education provided on diagnosis, treatment, follow up, and prescriptions provided. Pt verbalized understanding. All questions answered.  Pt taken to lobby in wheelchair by family.

## 2017-02-11 NOTE — DISCHARGE INSTRUCTIONS
Bronchitis  Bronchitis is a problem of the air tubes leading to your lungs. This problem makes it hard for air to get in and out of the lungs. You may cough a lot because your air tubes are narrow. Going without care can cause lasting (chronic) bronchitis.  HOME CARE   · Drink enough fluids to keep your pee (urine) clear or pale yellow.  · Use a cool mist humidifier.  · Quit smoking if you smoke. If you keep smoking, the bronchitis might not get better.  · Only take medicine as told by your doctor.  GET HELP RIGHT AWAY IF:   · Coughing keeps you awake.  · You start to wheeze.  · You become more sick or weak.  · You have a hard time breathing or get short of breath.  · You cough up blood.  · Coughing lasts more than 2 weeks.  · You have a fever.  · Your baby is older than 3 months with a rectal temperature of 102° F (38.9° C) or higher.  · Your baby is 3 months old or younger with a rectal temperature of 100.4° F (38° C) or higher.  MAKE SURE YOU:  · Understand these instructions.  · Will watch your condition.  · Will get help right away if you are not doing well or get worse.  Document Released: 06/05/2009 Document Revised: 03/11/2013 Document Reviewed: 11/19/2010  ExitCare® Patient Information ©2014 ExitCare, LLC.    Blood Glucose Monitoring, Adult  Monitoring your blood glucose (also know as blood sugar) helps you to manage your diabetes. It also helps you and your health care provider monitor your diabetes and determine how well your treatment plan is working.  WHY SHOULD YOU MONITOR YOUR BLOOD GLUCOSE?  · It can help you understand how food, exercise, and medicine affect your blood glucose.  · It allows you to know what your blood glucose is at any given moment. You can quickly tell if you are having low blood glucose (hypoglycemia) or high blood glucose (hyperglycemia).  · It can help you and your health care provider know how to adjust your medicines.  · It can help you understand how to manage an illness or  adjust medicine for exercise.  WHEN SHOULD YOU TEST?  Your health care provider will help you decide how often you should check your blood glucose. This may depend on the type of diabetes you have, your diabetes control, or the types of medicines you are taking. Be sure to write down all of your blood glucose readings so that this information can be reviewed with your health care provider. See below for examples of testing times that your health care provider may suggest.  Type 1 Diabetes  · Test at least 2 times per day if your diabetes is well controlled, if you are using an insulin pump, or if you perform multiple daily injections.  · If your diabetes is not well controlled or if you are sick, you may need to test more often.  · It is a good idea to also test:  ¨ Before every insulin injection.  ¨ Before and after exercise.  ¨ Between meals and 2 hours after a meal.  ¨ Occasionally between 2:00 a.m. and 3:00 a.m.  Type 2 Diabetes  · If you are taking insulin, test at least 2 times per day. However, it is best to test before every insulin injection.  · If you take medicines by mouth (orally), test 2 times a day.  · If you are on a controlled diet, test once a day.  · If your diabetes is not well controlled or if you are sick, you may need to monitor more often.  HOW TO MONITOR YOUR BLOOD GLUCOSE  Supplies Needed  · Blood glucose meter.  · Test strips for your meter. Each meter has its own strips. You must use the strips that go with your own meter.  · A pricking needle (lancet).  · A device that holds the lancet (lancing device).  · A journal or log book to write down your results.  Procedure  · Wash your hands with soap and water. Alcohol is not preferred.  · Prick the side of your finger (not the tip) with the lancet.  · Gently milk the finger until a small drop of blood appears.  · Follow the instructions that come with your meter for inserting the test strip, applying blood to the strip, and using your blood  "glucose meter.  Other Areas to Get Blood for Testing  Some meters allow you to use other areas of your body (other than your finger) to test your blood. These areas are called alternative sites. The most common alternative sites are:  · The forearm.  · The thigh.  · The back area of the lower leg.  · The palm of the hand.  The blood flow in these areas is slower. Therefore, the blood glucose values you get may be delayed, and the numbers are different from what you would get from your fingers. Do not use alternative sites if you think you are having hypoglycemia. Your reading will not be accurate. Always use a finger if you are having hypoglycemia. Also, if you cannot feel your lows (hypoglycemia unawareness), always use your fingers for your blood glucose checks.  ADDITIONAL TIPS FOR GLUCOSE MONITORING  · Do not reuse lancets.  · Always carry your supplies with you.  · All blood glucose meters have a 24-hour \"hotline\" number to call if you have questions or need help.  · Adjust (calibrate) your blood glucose meter with a control solution after finishing a few boxes of strips.  BLOOD GLUCOSE RECORD KEEPING  It is a good idea to keep a daily record or log of your blood glucose readings. Most glucose meters, if not all, keep your glucose records stored in the meter. Some meters come with the ability to download your records to your home computer. Keeping a record of your blood glucose readings is especially helpful if you are wanting to look for patterns. Make notes to go along with the blood glucose readings because you might forget what happened at that exact time. Keeping good records helps you and your health care provider to work together to achieve good diabetes management.      This information is not intended to replace advice given to you by your health care provider. Make sure you discuss any questions you have with your health care provider.     Document Released: 12/20/2004 Document Revised: 01/08/2016 " Document Reviewed: 05/12/2014  [employer] Interactive Patient Education ©2016 [employer]  Bronchospasm, Adult  A bronchospasm is a spasm or tightening of the airways going into the lungs. During a bronchospasm breathing becomes more difficult because the airways get smaller. When this happens there can be coughing, a whistling sound when breathing (wheezing), and difficulty breathing. Bronchospasm is often associated with asthma, but not all patients who experience a bronchospasm have asthma.  CAUSES   A bronchospasm is caused by inflammation or irritation of the airways. The inflammation or irritation may be triggered by:   · Allergies (such as to animals, pollen, food, or mold). Allergens that cause bronchospasm may cause wheezing immediately after exposure or many hours later.    · Infection. Viral infections are believed to be the most common cause of bronchospasm.    · Exercise.    · Irritants (such as pollution, cigarette smoke, strong odors, aerosol sprays, and paint fumes).    · Weather changes. Winds increase molds and pollens in the air. Rain refreshes the air by washing irritants out. Cold air may cause inflammation.    · Stress and emotional upset.    SIGNS AND SYMPTOMS   · Wheezing.    · Excessive nighttime coughing.    · Frequent or severe coughing with a simple cold.    · Chest tightness.    · Shortness of breath.    DIAGNOSIS   Bronchospasm is usually diagnosed through a history and physical exam. Tests, such as chest X-rays, are sometimes done to look for other conditions.  TREATMENT   · Inhaled medicines can be given to open up your airways and help you breathe. The medicines can be given using either an inhaler or a nebulizer machine.  · Corticosteroid medicines may be given for severe bronchospasm, usually when it is associated with asthma.  HOME CARE INSTRUCTIONS   · Always have a plan prepared for seeking medical care. Know when to call your health care provider and local emergency services  (911 in the [place] Know where you can access local emergency care.  · Only take medicines as directed by your health care provider.  · If you were prescribed an inhaler or nebulizer machine, ask your health care provider to explain how to use it correctly. Always use a spacer with your inhaler if you were given one.  · It is necessary to remain calm during an attack. Try to relax and breathe more slowly.   · Control your home environment in the following ways:    · Change your heating and air conditioning filter at least once a month.    · Limit your use of fireplaces and wood stoves.  · Do not smoke and do not allow smoking in your home.    · Avoid exposure to perfumes and fragrances.    · Get rid of pests (such as roaches and mice) and their droppings.    · Throw away plants if you see mold on them.    · Keep your house clean and dust free.    · Replace carpet with wood, tile, or vinyl bibi. Carpet can trap dander and dust.    · Use allergy-proof pillows, mattress covers, and box spring covers.    · Wash bed sheets and blankets every week in hot water and dry them in a dryer.    · Use blankets that are made of polyester or cotton.    · Wash hands frequently.  SEEK MEDICAL CARE IF:   · You have muscle aches.    · You have chest pain.    · The sputum changes from clear or white to yellow, green, gray, or bloody.    · The sputum you cough up gets thicker.    · There are problems that may be related to the medicine you are given, such as a rash, itching, swelling, or trouble breathing.    SEEK IMMEDIATE MEDICAL CARE IF:   · You have worsening wheezing and coughing even after taking your prescribed medicines.    · You have increased difficulty breathing.    · You develop severe chest pain.  MAKE SURE YOU:   · Understand these instructions.  · Will watch your condition.  · Will get help right away if you are not doing well or get worse.     This information is not intended to replace advice given to you by your  health care provider. Make sure you discuss any questions you have with your health care provider.     Document Released: 12/20/2004 Document Revised: 01/08/2016 Document Reviewed: 06/09/2014  Elsevier Interactive Patient Education ©2016 Elsevier Inc.

## 2017-02-11 NOTE — ED PROVIDER NOTES
ED Provider Note    CHIEF COMPLAINT  Chief Complaint   Patient presents with   • Cough   • Headache   • Rib Pain   • Sore Throat   • Shortness of Breath       [facility]  Jessica Cueto is a 66 y.o. female who presents for evaluation of upper respiratory symptoms.  The patient presents with a one-day history of nasal congestion, greenish rhinorrhea, along with a cough and shortness breath.  Patient does have a history of reactive airway disease/bronchospasm and uses a rescue inhaler along with home nebulization treatments.  The patient just returned from [place] and states that her daughter was recently diagnosed with strep and that there is an elderly gentleman across the eyelid on the airplane he was coughing quite extensively.  The patient only has some mild sharp pleuritic pain over the right lateral chest with coughing but does not feel all that time.  No substernal chest pain.  She is unsure whether she had a fever.  She states she has diabetic gastroparesis and does vomit easily.  No other acute symptomatology or complaints.    REVIEW OF SYSTEMS  See HPI for further details.  The patient denies: Thyroid dysfunction, seizures, heart disease, stroke.  All other systems negative.    PAST MEDICAL HISTORY  Past Medical History   Diagnosis Date   • Diabetes (CMS-[facility])    • Cancer (CMS-HCC) 1995     uterine cancer       FAMILY HISTORY  Family History   Problem Relation Age of Onset   • Diabetes Mother    • Heart Disease Mother    • Heart Disease Father    • Stroke Sister    • Diabetes Brother        SOCIAL HISTORY  Previous smoker; no alcohol or drugs;    SURGICAL HISTORY  Past Surgical History   Procedure Laterality Date   • Tonsillectomy and adenoidectomy     • Appendectomy     • Cholecystectomy     • Abdominal hysterectomy total     • Stent placement     • Debridement         CURRENT MEDICATIONS  See nurses notes    ALLERGIES  Allergies   Allergen Reactions   • Pcn [Penicillins] Anaphylaxis   • Rocephin [Ceftriaxone  "Sodium-Lidocaine] Anaphylaxis       PHYSICAL EXAM  VITAL SIGNS: /51 mmHg  Pulse 92  Temp(Src) 37.6 °C (99.6 °F)  Resp 16  Ht 1.575 m (5' 2\")  Wt 94.802 kg (209 lb)  BMI 38.22 kg/m2  SpO2 99%   Constitutional: 66-year-old  female, coughing, appears mildly weak but oriented ×3  HENT: ,Atraumatic, Bilateral external ears normal, tympanic membranes clear, Oropharynx moist, No oral exudates, Nose normal.   Eyes: PERRL, EOMI, Conjunctiva normal, No discharge.   Neck: Normal range of motion, No tenderness, Supple, No stridor.   Lymphatic: No lymphadenopathy noted.   Cardiovascular: Normal heart rate, Normal rhythm, No murmurs, No rubs, No gallops.   Thorax & Lungs: Mild bilateral rhonchi, No respiratory distress, No wheezing, no stridor, no rales. No chest tenderness.   Abdomen: Soft, nontender, nondistended, no organomegaly, positive bowel sounds normal in quality. No guarding or rebound.  Skin: Good skin turgor, pink, warm, dry. No rashes, petechiae, purpura. Normal capillary refill.   Back: No tenderness, No CVA tenderness.   Extremities: Intact distal pulses, No edema, No tenderness, No cyanosis, No clubbing. Vascular: Pulses are 2+, symmetric in the upper and lower extremities.  Negative Homans sign; no tenderness to her calf or thighs  Musculoskeletal: Diffuse arthritic changes. No tenderness to palpation or major deformities noted.   Neurologic: Alert & oriented x 3, Normal motor function, Normal sensory function, No gross focal deficits noted.   Psychiatric: Affect normal, Judgment normal, Mood normal.     RADIOLOGY/PROCEDURES  DX-CHEST-LIMITED (1 VIEW)   Final Result      No evidence of acute cardiopulmonary process.            COURSE & MEDICAL DECISION MAKING  Pertinent Labs & Imaging studies reviewed. (See chart for details)  1.  Monitor    2.  IV normal saline    Laboratory studies: CBC shows white count 6.7, 78% neutrophils, 12% side, 7% monocytes, hemoglobin 15.2, hematocrit 46.6; CMP " shows a random glucose of 180, bilirubin 1.8, otherwise within normal limits; BMP 40;    Discussion: At this time, the patient presents with upper respiratory symptomatology.  There is no evidence pneumonia on chest x-ray and laboratory studies are unremarkable.  The patient has normal vital signs with a low-grade temperature.  Pulse oximetry is normal at 99%.  She has no active wheezing.  At this time, based on the findings of feel we can safely discharge the patient.  I have discussed the findings and treatment plan with the patient and her daughter.  They indicate that they're comfortable with this explanation and disposition.    FINAL IMPRESSION  1. Bronchitis    2. Type 2 diabetes mellitus without complication, without long-term current use of insulin (HCC)           PLAN  1.  Appropriate discharge instructions given  2.  Azithromycin, Z-Matthew  3.  Tussionex syrup, 90 mL  4.  Albuterol Nebules  5.  Follow up closely with primary care on Monday   6.  She was instructed to return at any time should she feel like she is developing a fever change or worsening symptoms or any disconcerting symptoms she is not experiencing at this time.    Electronically signed by: Guy G Gansert, 2/10/2017 8:14 PM

## 2017-02-12 NOTE — ED NOTES
"ED Positive Culture Follow-up/Notification Note:    Date: 2/11/17     Patient seen in the ED on 2/10/2017 for URI symptoms: nasal congestion, greenish rhinonrrhea, cough, SOB, fatigue X 1 day. Flew back from [place] yesterday. Daughter recently diagnosed with Strep.    1. Bronchitis    2. Type 2 diabetes mellitus without complication, without long-term current use of insulin ([facility])       Discharge Medication List as of 2/10/2017  8:40 PM      START taking these medications    Details   azithromycin (ZITHROMAX) 250 MG Tab Take two tabs by mouth on day one, then one tab by mouth daily on days 2-5., Disp-6 Tab, R-0, Print Rx Paper      Hydrocod Polst-CPM Polst ER (TUSSIONEX PENNKINETIC ER) 10-8 MG/5ML Suspension Extended Release Take 5 mL by mouth every 12 hours as needed for Cough., Disp-90 mL, R-0, Print Rx Paper      !! albuterol (PROVENTIL) 2.5mg/3ml Nebu Soln solution for nebulization 3 mL by Nebulization route every four hours as needed for Shortness of Breath., Disp-75 mL, R-0, Print Rx Paper       !! - Potential duplicate medications found. Please discuss with provider.          Allergies: Pcn and Rocephin     Final cultures:   Results     Procedure Component Value Units Date/Time    BLOOD CULTURE [[id number]]  (Abnormal) Collected:  02/10/17 1630    Order Status:  Completed Specimen Information:  Blood from Peripheral Updated:  02/11/17 1440     Significant Indicator POS (POS)      Source BLD      Site PERIPHERAL      Blood Culture -- (A)      Result:        Growth detected by Bactec instrument.  02/11/2017  14:40  Gram Stain: Gram positive cocci: Possible Streptococcus sp.      Narrative:      Per Hospital Policy: Only change Specimen Src: to \"Line\" if  specified by physician order.    BLOOD CULTURE [[id number]]     Order Status:  Canceled Specimen Information:  Blood from Peripheral           Plan:   Per microbiology only 1 (anaerobic) of 2 bottles positive for possible Strep species.  This is likely a " contaminant.  Contacted patient to check on clinical status. She reports no change in her symptoms, but has not yet started taking antibiotics. She was able to pick them up today. Unsure if she has fever. Encouraged compliance with antibiotics and return precautions provided.   Will await final culture results and follow up further at that time.    Davide Ramirez, PharmD    2/14/17: Blood culture result finalized as viridans Strep, which represents contamination. No further follow up indicated.    Davide Ramirez, PharmD

## 2017-02-13 LAB
BACTERIA BLD CULT: ABNORMAL
BACTERIA BLD CULT: ABNORMAL
SIGNIFICANT IND 70042: ABNORMAL
SITE SITE: ABNORMAL
SOURCE SOURCE: ABNORMAL

## 2017-07-18 ENCOUNTER — PATIENT OUTREACH (OUTPATIENT)
Dept: HEALTH INFORMATION MANAGEMENT | Facility: OTHER | Age: 67
End: 2017-07-18

## 2017-07-18 DIAGNOSIS — Z59.9 INADEQUATE COMMUNITY RESOURCES: ICD-10-CM

## 2017-07-18 DIAGNOSIS — Z65.8: ICD-10-CM

## 2017-07-18 SDOH — ECONOMIC STABILITY - INCOME SECURITY: PROBLEM RELATED TO HOUSING AND ECONOMIC CIRCUMSTANCES, UNSPECIFIED: Z59.9
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
## 2017-07-18 NOTE — PROGRESS NOTES
"RN CC received referral from pharmacist to contact patient for possible enrollment in complex care management.  CC outreach call.  Provided patient with CC role and reason for call.    Patient states she is wanting a new PCP.  Reports problems with scheduling with her current PCP.  States she is not wanting to schedule with APN or PA.  CC recommended patient contact [phone] for assistance with providers that are accepting new patients.  Patient asked CC about longitudinal plan of care.  CC attempted to respond with patient   stated \"don't you know what longitudinal plan of care is?\"   Patient hung up on CC before CC could respond.  CC will not actively follow at this time. Patient understands to contact SCP when she selects a new PCP.  "

## 2017-07-20 ENCOUNTER — PATIENT OUTREACH (OUTPATIENT)
Dept: HEALTH INFORMATION MANAGEMENT | Facility: OTHER | Age: 67
End: 2017-07-20

## 2017-07-20 NOTE — PROGRESS NOTES
Pt referred to  RN and SW care coordination by pharmacist (Robert). Referral to SW care coordination placed for community resources and report that patient is interested in programs for seniors and possible volunteer opportunities. Per chart review appears pt was not interested in RN care coordination services, note indicated that pt disconnected phone with RN.     Outreach call to pt to determine if pt was still interested in community resources and  care coordination. Pt did not answer. LSW left VM and requested pt to call LSW back.     Plan:  · LSW will attempt to reach pt at later time/date, if LSW does not hear back from pt.

## 2017-07-25 ENCOUNTER — PATIENT OUTREACH (OUTPATIENT)
Dept: HEALTH INFORMATION MANAGEMENT | Facility: OTHER | Age: 67
End: 2017-07-25

## 2017-07-25 NOTE — PROGRESS NOTES
SW care coordination referral placed by pharmacist (Robert) for community resources and report that patient is interested in programs for seniors and possible volunteer opportunities.  2nd attempt to reach pt to determine if pt was still interested in community resources and  care coordination. Pt answered the phone.     She reported she recently moved here from [place] and is wanting to get more involved with the community, senior events and activities. Pt is wanting to be more active. Discussed different resources and events with [facility] and opportunities to volunteer with [facility] with either children or other seniors. Pt voiced interest in both events and volunteering with other seniors.     Pt requested LSW mail her information on mentioned activities for her to follow up with. Discussed if pt needed LSW assistance in following up with any resources. Pt declined at this time reporting she is able to follow up independently as long as she has the info. Informed pt that LSW will mail out all information discussed. Lastly discussed with pt if she had completed an Advanced Directive. Pt reported she has not since being in [place]. Informed pt of workshops offered with [facility] and encouraged her to attend. Informed pt that workshop flier would also be mailed. Pt agreeable.    Plan:  · [facility] calendar of events and activities mailed to pt along with meal calendar, and monthly newsletter.  · Information and applications for [employer]'s Foster Grandparent and Senior  program mailed.   · AD workshop flyer mailed.  · LSW will not actively follow pt at this time.  Pt to independently follow up with resources and if she needs additional assistance to reach out to LSW.

## 2017-08-17 ENCOUNTER — PATIENT OUTREACH (OUTPATIENT)
Dept: HEALTH INFORMATION MANAGEMENT | Facility: OTHER | Age: 67
End: 2017-08-17

## 2017-08-17 NOTE — PROGRESS NOTES
Outcome: Left Message    WebIZ Checked & Epic Updated:  NO WEBIZ FOUND    HealthConnect Verified: yes    Attempt # 1

## 2017-08-18 NOTE — PROGRESS NOTES
Attempt #:2    WebIZ Checked & Epic Updated: yes  HealthConnect Verified: no  Verify PCP: yes    Communication Preference Obtained: yes     Review Care Team: yes    Annual Wellness Visit Scheduling  1. Scheduling Status:Scheduled        Care Gap Scheduling (Attempt to Schedule EACH Overdue Care Gap!)     Health Maintenance Due   Topic Date Due   • Annual Wellness Visit  SCHEDULED   • DIABETES MONOFILAMENT / LE EXAM  SCHEDULED   • RETINAL SCREENING  SCHEDULED   • IMM DTaP/Tdap/Td Vaccine (1 - Tdap) SCHEDULED   • PAP SMEAR  65+   • IMM ZOSTER VACCINE  SCHEDULED   • IMM PNEUMOCOCCAL 65+ (ADULT) LOW/MEDIUM RISK SERIES (1 of 2 - PCV13) SCHEDULED   • A1C SCREENING  PT WILL COMPLETE   • FASTING LIPID PROFILE  PT WILL COMPLETE   • URINE ACR / MICROALBUMIN  PT WILL COMPLETE   • MAMMOGRAM  WILL DISCUSS WITH PCP         MyChart Activation: declined  MyChart Lara: no  Virtual Visits: no  Opt In to Text Messages: no

## 2017-09-18 ENCOUNTER — TELEPHONE (OUTPATIENT)
Dept: MEDICAL GROUP | Facility: MEDICAL CENTER | Age: 67
End: 2017-09-18

## 2017-09-18 DIAGNOSIS — I10 ESSENTIAL HYPERTENSION: ICD-10-CM

## 2017-09-18 DIAGNOSIS — E11.9 CONTROLLED TYPE 2 DIABETES MELLITUS WITHOUT COMPLICATION, WITHOUT LONG-TERM CURRENT USE OF INSULIN (HCC): ICD-10-CM

## 2017-09-18 RX ORDER — LANCETS 30 GAUGE
EACH MISCELLANEOUS
Qty: 100 EACH | Refills: 11 | Status: SHIPPED | OUTPATIENT
Start: 2017-09-18 | End: 2017-09-19 | Stop reason: SDUPTHER

## 2017-09-18 RX ORDER — LOSARTAN POTASSIUM 50 MG/1
50 TABLET ORAL DAILY
Qty: 90 TAB | Refills: 0 | Status: SHIPPED | OUTPATIENT
Start: 2017-09-18 | End: 2017-10-02

## 2017-09-18 NOTE — TELEPHONE ENCOUNTER
Was the patient seen in the last year in this department? No has AWV scheduled for next wk    Does patient have an active prescription for medications requested? No     Received Request Via: Patient

## 2017-09-18 NOTE — LETTER
[employer]  Don Chatterjee M.D.  [address]  [place] [address]  Fax: [phone]   Authorization for Release/Disclosure of   Protected Health Information   Name: JESSICA CUETO : 1950 SSN: [id number]   Address: [facility] [address]  [place] [address] Phone:    [phone] (home)    I authorize the entity listed below to release/disclose the PHI below to:   [employer]/Don Chatterjee M.D. and Don Chatterjee M.D.   Provider or Entity Name: Syd Leigh M.D.   Address   [facility], [facility], [facility]   Phone:    Fax: [phone]   Reason for request: continuity of care   Information to be released:    [  ] LAST COLONOSCOPY,  including any PATH REPORT and follow-up  [  ] LAST FIT/COLOGUARD RESULT [  ] LAST DEXA  [  ] LAST MAMMOGRAM  [  ] LAST PAP  [  ] LAST LABS [X] RETINA EXAM REPORT  [  ] IMMUNIZATION RECORDS  [  ] Release all info      [  ] Check here and initial the line next to each item to release ALL health information INCLUDING  _____ Care and treatment for drug and / or alcohol abuse  _____ HIV testing, infection status, or AIDS  _____ Genetic Testing    DATES OF SERVICE OR TIME PERIOD TO BE DISCLOSED: _____________  I understand and acknowledge that:  * This Authorization may be revoked at any time by you in writing, except if your health information has already been used or disclosed.  * Your health information that will be used or disclosed as a result of you signing this authorization could be re-disclosed by the recipient. If this occurs, your re-disclosed health information may no longer be protected by State or Federal laws.  * You may refuse to sign this Authorization. Your refusal will not affect your ability to obtain treatment.  * This Authorization becomes effective upon signing and will  on (date) __________.      If no date is indicated, this Authorization will  one (1) year from the signature date.    Name: Jessica Cueto    Signature:                     Date:  9/20/2017       PLEASE FAX REQUESTED RECORDS BACK TO: [phone]

## 2017-09-18 NOTE — TELEPHONE ENCOUNTER
Future Appointments       Provider Department Center    9/27/2017 8:40 AM Don Chatterjee M.D.; [facility] [address]    10/2/2017 9:00 AM Don Chatterjee M.D.; [facility] DIABETES RN [address]          ANNUAL WELLNESS VISIT PRE-VISIT PLANNING     1.  Reviewed note from last office visit with PCP: YES Last office visit: 05/06/16    2.  If any orders were placed at last visit, do we have Results/Consult Notes?        •  Labs - Labs ordered, completed and results are in chart. 02/10/17       •  Imaging - Imaging ordered, completed and results are in chart. 01/18/17 AND 02/10/17       •  Referrals - Referral ordered, patient was seen and consult notes are in chart. Care Teams updated  YES.     3.  Immunizations were updated in Epic using WebIZ?: No WebIZ record       •  WebIZ Recommendations:FLU, PREVNAR (PCV13) , TDAP and ZOSTAVAX (Shingles)       •  Is patient due for Tdap? YES. Patient was notified of copay.       •  Is patient due for Shingles? YES. Patient was notified of copay.     4.  Patient is due for the following Health Maintenance Topics:   Health Maintenance Due   Topic Date Due   • Annual Wellness Visit  SCHEDULED FOR 09/27/17   • DIABETES MONOFILAMENT / LE EXAM  02/14/1951   • RETINAL SCREENING  REQUESTING RECORDS   • MAMMOGRAM DUE   • IMM INFLUENZA (1)  DUE DURING VISIT   • IMM PREVNAR 13 DUE DURING VISIT   • IMM DTaP/Tdap/Td Vaccine (1 - Tdap)  DECLINED   • IMM ZOSTER VACCINE  DECLINED   • A1C SCREENING    09/16/2016   • URINE ACR / MICROALBUMIN   06/06/2017   • FASTING LIPID PROFILE  09/01/2017       - Patient plans to schedule appointment for Mammogram.      5.  Reviewed/Updated the following with patient:       •   Preferred Pharmacy? YES       •   Preferred Lab? YES       •   Medications? YES. Was Abstract Encounter opened and chart updated? YES       •   Social History? YES. Was Abstract Encounter opened and chart updated? YES       •    Family History? YES. Was Abstract Encounter opened and chart updated? YES    6.  Care Team Updated:       •   DME Company (gait device, O2, CPAP, etc.): YES       •   Other Specialists (eye doctor, derm, GYN, cardiology, endo, etc): YES       •   Last eye exam: 09/15/17    7. DPA/Advanced Directive:  Patient does not have an Advanced Directive.  A packet and workshop information was given on Advanced Directives.    8.  Patient has the following Care Path diagnoses on Problem List:  DIABETES    Has patient ever had diabetes education? No, but patient is interested. Referral pended.    9.  Specialty Comments was updated with diagnosis information provided by SCP: YES there is a note    10.  Patient was advised: “This is a free wellness visit. The provider will screen for medical conditions to help you stay healthy. If you have other concerns to address you may be asked to discuss these at a separate visit or there may be an additional fee.”     11.  Patient was informed to arrive 15 min prior to their scheduled appointment and bring in their medication bottles?  YES

## 2017-09-18 NOTE — LETTER
Request for Medical Records    Patient Name: Jessica Cueto    : 1950      Dear Doctor: Syd Leigh,    The above named patient receives primary care at the [facility] by Don Chatterjee M.D..  The patient informs us that you are her eye care Provider.    Please fax a copy of the most recent eye exam to [phone] or answer the  questions below and fax this sheet back to us at the above number.  Attached is a signed Release of Information.      Date of last eye exam: _____________    Retinal eye exam summary:        Please select the choice(s) that apply.    ____ No diabetic retinopathy    ____    Diabetic retinopathy present      Printed Name and Credentials: __________________________________    Signature of Eye Care Provider: _________________________________    We appreciate your assistance and collaboration in providing efficient patient care!    Kindest Regards,    [facility] [address]  [address] [facility]  [place] [address] [phone]

## 2017-09-19 DIAGNOSIS — E11.9 CONTROLLED TYPE 2 DIABETES MELLITUS WITHOUT COMPLICATION, WITHOUT LONG-TERM CURRENT USE OF INSULIN (HCC): ICD-10-CM

## 2017-09-19 RX ORDER — LANCETS 30 GAUGE
EACH MISCELLANEOUS
Qty: 100 EACH | Refills: 11 | Status: SHIPPED | OUTPATIENT
Start: 2017-09-19

## 2017-09-25 ENCOUNTER — HOSPITAL ENCOUNTER (OUTPATIENT)
Dept: LAB | Facility: MEDICAL CENTER | Age: 67
End: 2017-09-25
Attending: INTERNAL MEDICINE
Payer: MEDICARE

## 2017-09-25 DIAGNOSIS — I10 ESSENTIAL HYPERTENSION: ICD-10-CM

## 2017-09-25 DIAGNOSIS — E11.9 DIABETES MELLITUS TYPE 2, CONTROLLED (HCC): ICD-10-CM

## 2017-09-25 DIAGNOSIS — E04.2 MULTIPLE THYROID NODULES: ICD-10-CM

## 2017-09-25 LAB
BASOPHILS # BLD AUTO: 0.9 % (ref 0–1.8)
BASOPHILS # BLD: 0.07 K/UL (ref 0–0.12)
CREAT UR-MCNC: 93.8 MG/DL
EOSINOPHIL # BLD AUTO: 0.16 K/UL (ref 0–0.51)
EOSINOPHIL NFR BLD: 2.1 % (ref 0–6.9)
ERYTHROCYTE [DISTWIDTH] IN BLOOD BY AUTOMATED COUNT: 44.8 FL (ref 35.9–50)
EST. AVERAGE GLUCOSE BLD GHB EST-MCNC: 289 MG/DL
HBA1C MFR BLD: 11.7 % (ref 0–5.6)
HCT VFR BLD AUTO: 46.8 % (ref 37–47)
HGB BLD-MCNC: 15.1 G/DL (ref 12–16)
IMM GRANULOCYTES # BLD AUTO: 0.03 K/UL (ref 0–0.11)
IMM GRANULOCYTES NFR BLD AUTO: 0.4 % (ref 0–0.9)
LYMPHOCYTES # BLD AUTO: 2.31 K/UL (ref 1–4.8)
LYMPHOCYTES NFR BLD: 30.8 % (ref 22–41)
MCH RBC QN AUTO: 30.2 PG (ref 27–33)
MCHC RBC AUTO-ENTMCNC: 32.3 G/DL (ref 33.6–35)
MCV RBC AUTO: 93.6 FL (ref 81.4–97.8)
MICROALBUMIN UR-MCNC: 69.1 MG/DL
MICROALBUMIN/CREAT UR: 737 MG/G (ref 0–30)
MONOCYTES # BLD AUTO: 0.43 K/UL (ref 0–0.85)
MONOCYTES NFR BLD AUTO: 5.7 % (ref 0–13.4)
NEUTROPHILS # BLD AUTO: 4.49 K/UL (ref 2–7.15)
NEUTROPHILS NFR BLD: 60.1 % (ref 44–72)
NRBC # BLD AUTO: 0 K/UL
NRBC BLD AUTO-RTO: 0 /100 WBC
PLATELET # BLD AUTO: 231 K/UL (ref 164–446)
PMV BLD AUTO: 12.4 FL (ref 9–12.9)
RBC # BLD AUTO: 5 M/UL (ref 4.2–5.4)
WBC # BLD AUTO: 7.5 K/UL (ref 4.8–10.8)

## 2017-09-25 PROCEDURE — 82570 ASSAY OF URINE CREATININE: CPT

## 2017-09-25 PROCEDURE — 84443 ASSAY THYROID STIM HORMONE: CPT

## 2017-09-25 PROCEDURE — 82043 UR ALBUMIN QUANTITATIVE: CPT

## 2017-09-25 PROCEDURE — 36415 COLL VENOUS BLD VENIPUNCTURE: CPT

## 2017-09-25 PROCEDURE — 85025 COMPLETE CBC W/AUTO DIFF WBC: CPT

## 2017-09-25 PROCEDURE — 83036 HEMOGLOBIN GLYCOSYLATED A1C: CPT

## 2017-09-27 ENCOUNTER — APPOINTMENT (OUTPATIENT)
Dept: MEDICAL GROUP | Facility: MEDICAL CENTER | Age: 67
End: 2017-09-27
Payer: MEDICARE

## 2017-10-02 ENCOUNTER — OFFICE VISIT (OUTPATIENT)
Dept: MEDICAL GROUP | Facility: MEDICAL CENTER | Age: 67
End: 2017-10-02
Payer: MEDICARE

## 2017-10-02 VITALS
SYSTOLIC BLOOD PRESSURE: 180 MMHG | BODY MASS INDEX: 39.75 KG/M2 | TEMPERATURE: 97.5 F | DIASTOLIC BLOOD PRESSURE: 80 MMHG | WEIGHT: 216 LBS | OXYGEN SATURATION: 98 % | HEART RATE: 78 BPM | HEIGHT: 62 IN

## 2017-10-02 DIAGNOSIS — Z00.00 INITIAL MEDICARE ANNUAL WELLNESS VISIT: ICD-10-CM

## 2017-10-02 DIAGNOSIS — Z85.43 HISTORY OF OVARIAN CANCER: ICD-10-CM

## 2017-10-02 DIAGNOSIS — E11.9 CONTROLLED TYPE 2 DIABETES MELLITUS WITHOUT COMPLICATION, WITHOUT LONG-TERM CURRENT USE OF INSULIN (HCC): ICD-10-CM

## 2017-10-02 DIAGNOSIS — Z91.81 AT RISK FOR FALLS: ICD-10-CM

## 2017-10-02 DIAGNOSIS — F33.2 SEVERE EPISODE OF RECURRENT MAJOR DEPRESSIVE DISORDER, WITHOUT PSYCHOTIC FEATURES (HCC): ICD-10-CM

## 2017-10-02 DIAGNOSIS — I73.9 PVD (PERIPHERAL VASCULAR DISEASE) (HCC): ICD-10-CM

## 2017-10-02 DIAGNOSIS — E11.3493 SEVERE NONPROLIFERATIVE DIABETIC RETINOPATHY OF BOTH EYES ASSOCIATED WITH TYPE 2 DIABETES MELLITUS, MACULAR EDEMA PRESENCE UNSPECIFIED (HCC): ICD-10-CM

## 2017-10-02 DIAGNOSIS — G89.29 CHRONIC BILATERAL LOW BACK PAIN WITHOUT SCIATICA: ICD-10-CM

## 2017-10-02 DIAGNOSIS — Z13.220 SCREENING, LIPID: ICD-10-CM

## 2017-10-02 DIAGNOSIS — E11.51 TYPE 2 DIABETES MELLITUS WITH DIABETIC PERIPHERAL ANGIOPATHY WITHOUT GANGRENE, WITHOUT LONG-TERM CURRENT USE OF INSULIN (HCC): ICD-10-CM

## 2017-10-02 DIAGNOSIS — Z23 INFLUENZA VACCINE NEEDED: ICD-10-CM

## 2017-10-02 DIAGNOSIS — E66.9 OBESITY (BMI 35.0-39.9 WITHOUT COMORBIDITY): ICD-10-CM

## 2017-10-02 DIAGNOSIS — R80.9 MICROALBUMINURIA: ICD-10-CM

## 2017-10-02 DIAGNOSIS — I10 ESSENTIAL HYPERTENSION: ICD-10-CM

## 2017-10-02 DIAGNOSIS — Z23 NEED FOR PNEUMOCOCCAL VACCINE: ICD-10-CM

## 2017-10-02 DIAGNOSIS — M54.50 CHRONIC BILATERAL LOW BACK PAIN WITHOUT SCIATICA: ICD-10-CM

## 2017-10-02 PROCEDURE — 99215 OFFICE O/P EST HI 40 MIN: CPT | Mod: 25 | Performed by: INTERNAL MEDICINE

## 2017-10-02 PROCEDURE — 90662 IIV NO PRSV INCREASED AG IM: CPT | Performed by: INTERNAL MEDICINE

## 2017-10-02 PROCEDURE — G0438 PPPS, INITIAL VISIT: HCPCS | Mod: 25 | Performed by: INTERNAL MEDICINE

## 2017-10-02 PROCEDURE — 90670 PCV13 VACCINE IM: CPT | Performed by: INTERNAL MEDICINE

## 2017-10-02 PROCEDURE — G0008 ADMIN INFLUENZA VIRUS VAC: HCPCS | Performed by: INTERNAL MEDICINE

## 2017-10-02 PROCEDURE — G0009 ADMIN PNEUMOCOCCAL VACCINE: HCPCS | Performed by: INTERNAL MEDICINE

## 2017-10-02 RX ORDER — GLIMEPIRIDE 4 MG/1
4 TABLET ORAL 2 TIMES DAILY
Qty: 60 TAB | Refills: 3 | Status: SHIPPED | OUTPATIENT
Start: 2017-10-02 | End: 2017-12-19 | Stop reason: SDUPTHER

## 2017-10-02 RX ORDER — ONDANSETRON 4 MG/1
4 TABLET, FILM COATED ORAL EVERY 4 HOURS PRN
Qty: 20 TAB | Refills: 6 | Status: SHIPPED | OUTPATIENT
Start: 2017-10-02 | End: 2018-08-28

## 2017-10-02 RX ORDER — LOSARTAN POTASSIUM 100 MG/1
100 TABLET ORAL DAILY
Qty: 30 TAB | Refills: 11 | Status: SHIPPED | OUTPATIENT
Start: 2017-10-02 | End: 2018-09-27 | Stop reason: SDUPTHER

## 2017-10-02 ASSESSMENT — PATIENT HEALTH QUESTIONNAIRE - PHQ9
5. POOR APPETITE OR OVEREATING: 3 - NEARLY EVERY DAY
CLINICAL INTERPRETATION OF PHQ2 SCORE: 6
SUM OF ALL RESPONSES TO PHQ QUESTIONS 1-9: 21

## 2017-10-02 NOTE — PROGRESS NOTES
RN-LOUISAE Note  Type 2 diabetes.  Subjective:     Health changes since last visit/interval Hx: States not feeling well.  States more short of breath with the altitude.  States gastroparesis acting up.    Medications (including changes made today)  Current Outpatient Prescriptions   Medication Sig Dispense Refill   • metformin (GLUCOPHAGE) 500 MG Tab      • Blood Glucose Monitoring Suppl Supplies Misc Test strips order: Test strips for one touch  meter. Sig: use bid and prn ssx high or low sugar #100 RF x 3 100 Each 11   • Lancets Misc Lancets order: Lancets for one touch meter. Sig: use bid and prn ssx high or low sugar. #100 RF x 3 100 Each 11   • Blood Glucose Monitoring Suppl Device Meter: Dispense one touch meter. Sig. Use as directed for blood sugar monitoring. #1. NR. 1 Device 0   • losartan (COZAAR) 50 MG Tab Take 1 Tab by mouth every day. 90 Tab 0   • albuterol (PROVENTIL) 2.5mg/3ml Nebu Soln solution for nebulization 3 mL by Nebulization route every 6 hours as needed for Shortness of Breath. 75 mL 0   • albuterol (VENTOLIN OR PROVENTIL) 108 (90 BASE) MCG/ACT Aero Soln inhalation aerosol Inhale 2 Puffs by mouth every 6 hours as needed for Shortness of Breath. 8.5 g 3     No current facility-administered medications for this visit.          Taking daily ASA: No  Taking above medications as prescribed: Yes   Patient Denies side effects of medication.    Exercise: walking as much as possible.  Diet: States not hungry and misses a lot of meals.  Nauseated in the morning.  Eats a lot of canned soup.    Health Maintenance:   Health Maintenance Topics with due status: Overdue       Topic Date Due    Annual Wellness Visit 1950    DIABETES MONOFILAMENT / LE EXAM [birth year]1949    RETINAL SCREENING 08/14/1966    IMM DTaP/Tdap/Td Vaccine 08/14/1967    IMM ZOSTER VACCINE 08/14/2008    IMM PNEUMOCOCCAL 65+ (ADULT) LOW/MEDIUM RISK SERIES 08/14/2013    FASTING LIPID PROFILE 09/16/2016    MAMMOGRAM 06/06/2017    IMM  INFLUENZA 09/01/2017         DM:   Last A1c:   Lab Results   Component Value Date/Time    HBA1C 11.7 (H) 09/25/2017 08:35 AM      A1c goal: < 7    Glucose monitoring frequency: testing 3 or more times daily.   Needs a new meter  trend: 150-300  Hypoglycemic episodes: no     Last Retinal Exam: Weekly  Provider: [facility].  Retinopathy and getting weekly injections.  Daily Foot Exam: yes  Routine Dental Exams: yes    Lab Results   Component Value Date/Time    MALBCRT 737 (H) 09/25/2017 08:36 AM    MICROALBUR 69.1 09/25/2017 08:36 AM        ACR Albumin/Creatinine Ratio goal <30     Diabetic complications: retinopathy and peripheral neuropathy    HTN:   Blood pressure goal <140/<80 .   Currently Rx ACE/ARB: Yes    Dyslipidemia:    Lab Results   Component Value Date/Time    CHOLSTRLTOT 188 09/16/2015 09:23 AM     (H) 09/16/2015 09:23 AM    HDL 60 09/16/2015 09:23 AM    TRIGLYCERIDE 91 09/16/2015 09:23 AM       Lab Results   Component Value Date/Time    SODIUM 138 02/10/2017 04:30 PM    POTASSIUM 3.9 02/10/2017 04:30 PM    CHLORIDE 104 02/10/2017 04:30 PM    CO2 23 02/10/2017 04:30 PM    GLUCOSE 180 (H) 02/10/2017 04:30 PM    BUN 18 02/10/2017 04:30 PM    CREATININE 0.99 02/10/2017 04:30 PM     Lab Results   Component Value Date/Time    ALKPHOSPHAT 77 02/10/2017 04:30 PM    ASTSGOT 15 02/10/2017 04:30 PM    ALTSGPT 12 02/10/2017 04:30 PM    TBILIRUBIN 1.8 (H) 02/10/2017 04:30 PM        Currently Rx Statin: No      She  reports that she quit smoking about 25 years ago. Her smoking use included Cigarettes. She has a 9.50 pack-year smoking history. She has never used smokeless tobacco.    Objective:     Exam:  Monofilament: done  Monofilament testing with a 10 gram force: sensation intact: intact bilaterally  Visual Inspection: Feet without maceration, ulcers, fissures.  States right achilles heel heals and then reopens and bleeds.  Pedal pulses: intact bilaterally.  Right pulse decreased      Plan:     -  Diabetic diet discussed in detail-plate method.  - Home glucose monitoring.  - She will test and log.    - She will walk for 20-30 minutes daily.  - Reviewed medications and advised to take metformin after meals to decrease   G.I.upset.   - Discussed importance of immunizations and yearly eye exams.   - Encouraged patient to attend diabetes education program.   -Educational material distributed.   - Advised daily foot exams. Educated on signs of infection.       Recommended medication changes: She would benefit adding glimepiride for better blood sugar control.  Cost and not tolerating more Metformin is a factor.  She also needs better blood pressure control.

## 2017-10-02 NOTE — PROGRESS NOTES
CC: Shared visit diabetic nurse due for wellness examination.    HPI:   Jessica presents today with the following.    1. Initial Medicare annual wellness visit  Screenings performed below. Presents not having been seen for over year. She reports she's been busy with eye doctors that are referred to last year. A1c recently checked found to be 11. She does report excessive urination poor diet.    2. Type 2 diabetes mellitus with diabetic peripheral angiopathy without gangrene, without long-term current use of insulin (CMS-HCC)  She does have peripheral vascular disease denying advanced pain in her extremities with ambulation.    3. Severe nonproliferative diabetic retinopathy of both eyes associated with type 2 diabetes mellitus, macular edema presence unspecified (CMS-HCC)  As mentioned above severe retinopathy eating weekly injections from ophthalmology.    4. Essential hypertension  Blood pressure significantly elevated denying any chest pain or shortness of breath no edema.    5. Microalbuminuria  Microalbumin of 700 and the urine maintain a losartan 50 mg.    6. Obesity (BMI 35.0-39.9 without comorbidity)  Weight is stable since last year difficulty with exercise.    7. History of ovarian cancer  Denying any abdominal pain or bloating.    8. PVD (peripheral vascular disease) (CMS-HCC)  Again persistent leg pain with ambulation but nothing progressive.    9. Chronic bilateral low back pain without sciatica  She reports back pain is stable denying any loss of bowel or bladder.    10. Severe episode of recurrent major depressive disorder, without psychotic features (CMS-HCC  Identified with depression on today's screening.    11. At risk for falls  She does use a cane denying any recent falls.        Depression Screening    Little interest or pleasure in doing things?  3 - nearly every day  Feeling down, depressed , or hopeless? 3 - nearly every day  Trouble falling or staying asleep, or sleeping too much?  3 - nearly  every day  Feeling tired or having little energy?  3 - nearly every day  Poor appetite or overeating?  3 - nearly every day  Feeling bad about yourself - or that you are a failure or have let yourself or your family down? 3 - nearly every day  Trouble concentrating on things, such as reading the newspaper or watching television? 3 - nearly every day  Moving or speaking so slowly that other people could have noticed.  Or the opposite - being so fidgety or restless that you have been moving around a lot more than usual?  0 - not at all  Thoughts that you would be better off dead, or of hurting yourself?  0 - not at all  Patient Health Questionnaire Score: 21    If depressive symptoms identified deferred to follow up visit unless specifically addressed in assessment and plan.    Interpretation of PHQ-9 Total Score   Score Severity   1-4 No Depression   5-9 Mild Depression   10-14 Moderate Depression   15-19 Moderately Severe Depression   20-27 Severe Depression      Screening for Cognitive Impairment    Three Minute Recall (apple, watch, rocio)  2/3    Draw clock face with all 12 numbers set to the hand to show 10 minutes past 11 o'clock  1 5/5  Cognitive concerns identified deferred for follow up unless specifically addressed in assessment and plan.    Fall Risk Assessment    Has the patient had two or more falls in the last year or any fall with injury in the last year?  Yes    Safety Assessment    Throw rugs on floor.  No  Handrails on all stairs.  No  Good lighting in all hallways.  No  Difficulty hearing.  Yes  Patient counseled about all safety risks that were identified.    Functional Assessment ADLs    Are there any barriers preventing you from cooking for yourself or meeting nutritional needs?  No.    Are there any barriers preventing you from driving safely or obtaining transportation?  No.    Are there any barriers preventing you from using a telephone or calling for help?  No.    Are there any barriers  preventing you from shopping?  No.    Are there any barriers preventing you from taking care of your own finances?  No.    Are there any barriers preventing you from managing your medications?  No.    Are currently engaging any exercise or physical activity?  Yes.  Walking    Health Maintenance Summary                Annual Wellness Visit Overdue 1950     DIABETES MONOFILAMENT / LE EXAM Overdue 2/14/1949     RETINAL SCREENING Overdue 8/14/1966     IMM PNEUMOCOCCAL 65+ (ADULT) LOW/MEDIUM RISK SERIES Overdue 8/14/2013     FASTING LIPID PROFILE Overdue 9/16/2016      Done 9/16/2015 LIPID PROFILE (A)    MAMMOGRAM Overdue 6/6/2017      Done 6/6/2016 MA-SCREEN MAMMO W/CAD-BILAT    IMM INFLUENZA Overdue 9/1/2017     COLONOSCOPY Postponed 12/21/2017 Originally 5/3/2016. Patient Refused     Done 5/3/2011     SERUM CREATININE Next Due 2/10/2018      Done 2/10/2017 COMP METABOLIC PANEL (A)     Patient has more history with this topic...    A1C SCREENING Next Due 3/25/2018      Done 9/25/2017 HEMOGLOBIN A1C (A)     Patient has more history with this topic...    URINE ACR / MICROALBUMIN Next Due 9/25/2018      Done 9/25/2017 MICROALBUMIN CREAT RATIO URINE (A)     Patient has more history with this topic...    BONE DENSITY Next Due 6/6/2021      Done 6/6/2016 DS-BONE DENSITY STUDY (DEXA)          Patient Care Team:  Don Chatterjee M.D. as PCP - General (Internal Medicine)  Syd Leigh M.D. as Consulting Physician (Ophthalmology)  Natacha Perkins M.D. as Consulting Physician (Ophthalmology)      Patient Active Problem List    Diagnosis Date Noted   • Obesity (BMI 35.0-39.9 without comorbidity) 10/02/2017   • Severe nonproliferative diabetic retinopathy of both eyes associated with type 2 diabetes mellitus (CMS-[facility]) 10/02/2017   • Microalbuminuria 10/02/2017   • Severe episode of recurrent major depressive disorder, without psychotic features (CMS-[facility]) 10/02/2017   • History of ovarian cancer 05/06/2016   • Chronic low  back pain without sciatica 05/06/2016   • Multiple thyroid nodules 09/04/2015   • Essential hypertension 09/04/2015   • Chronic wound of extremity 08/26/2015   • Type 2 diabetes mellitus with diabetic peripheral angiopathy without gangrene, without long-term current use of insulin (CMS-HCC) 08/26/2015   • PVD (peripheral vascular disease) (CMS-HCC) 08/26/2015       Current Outpatient Prescriptions   Medication Sig Dispense Refill   • glimepiride (AMARYL) 4 MG Tab Take 1 Tab by mouth 2 times a day. 60 Tab 3   • losartan (COZAAR) 100 MG Tab Take 1 Tab by mouth every day. 30 Tab 11   • Blood Glucose Monitoring Suppl Supplies Misc Test strips order: Test strips for contour next meter. Sig: use bid 100 Each 11   • metformin (GLUCOPHAGE) 500 MG Tab Take 1 Tab by mouth 2 times a day. 60 Tab 11   • ondansetron (ZOFRAN) 4 MG Tab tablet Take 1 Tab by mouth every four hours as needed for Nausea/Vomiting. 20 Tab 6   • Lancets Misc Lancets order: Lancets for one touch meter. Sig: use bid and prn ssx high or low sugar. #100 RF x 3 100 Each 11   • Blood Glucose Monitoring Suppl Device Meter: Dispense one touch meter. Sig. Use as directed for blood sugar monitoring. #1. NR. 1 Device 0   • albuterol (PROVENTIL) 2.5mg/3ml Nebu Soln solution for nebulization 3 mL by Nebulization route every 6 hours as needed for Shortness of Breath. 75 mL 0   • Blood Glucose Monitoring Suppl Supplies Misc Test strips order: Test strips for Contour Next  meter. Sig: use bid and prn ssx high or low sugar #100 RF x 3 100 Each 11   • albuterol (VENTOLIN OR PROVENTIL) 108 (90 BASE) MCG/ACT Aero Soln inhalation aerosol Inhale 2 Puffs by mouth every 6 hours as needed for Shortness of Breath. 8.5 g 3     No current facility-administered medications for this visit.          Allergies as of 10/02/2017 - Reviewed 10/02/2017   Allergen Reaction Noted   • Pcn [penicillins] Anaphylaxis 08/21/2015   • Rocephin [ceftriaxone sodium-lidocaine] Anaphylaxis 08/21/2015  "       ROS: As per HPI.    BP (!) 180/80   Pulse 78   Temp 36.4 °C (97.5 °F)   Ht 1.575 m (5' 2\")   Wt 98 kg (216 lb)   SpO2 98%   BMI 39.51 kg/m²      Physical Exam:  Gen:         Alert and oriented, No apparent distress.  Neck:        No Lymphadenopathy or Bruits.  Lungs:     Clear to auscultation bilaterally  CV:          Regular rate and rhythm. No murmurs, rubs or gallops.  Abd:         Soft non tender, non distended. Normal active bowel sounds.  No  Hepatosplenomegaly, No pulsatile masses.                   Ext:          No clubbing, cyanosis, edema.      Assessment and Plan.   67 y.o. female with the following issues.    1. Initial Medicare annual wellness visit  Discussed healthy lifestyle habits as well as screening regimens. Information given on advanced directives.  - Initial Wellness Visit - Includes PPPS ()    2. Type 2 diabetes mellitus with diabetic peripheral angiopathy without gangrene, without long-term current use of insulin (CMS-HCC)  Starting on Amaryl 4 mg twice a day have referred to diabetic education above and beyond what she received today from the nurse.   - glimepiride (AMARYL) 4 MG Tab; Take 1 Tab by mouth 2 times a day.  Dispense: 60 Tab; Refill: 3  - losartan (COZAAR) 100 MG Tab; Take 1 Tab by mouth every day.  Dispense: 30 Tab; Refill: 11  - REFERRAL TO [facility] HEALTH IMPROVEMENT PROGRAMS (HIP) Services Requested: Registered Dietitian for Medical Nutrition Therapy; Reason for Visit: Management of Chronic Condition; Other: diabetes.  - Blood Glucose Monitoring Suppl Supplies Misc; Test strips order: Test strips for contour next meter. Sig: use bid  Dispense: 100 Each; Refill: 11    3. Severe nonproliferative diabetic retinopathy of both eyes associated with type 2 diabetes mellitus, macular edema presence unspecified (CMS-HCC)  We'll get records from ophthalmology.    4. Essential hypertension  Adding amlodipine at 5 mg cautioned about side effects.    5. " Microalbuminuria  Increasing losartan to 100 mg.    6. Obesity (BMI 35.0-39.9 without comorbidity)  Discussion today about diet and exercise in controlling diabetes.  - Patient identified as having weight management issue.  Appropriate orders and counseling given.  - Patient has been identified as being depressed and appropriate orders and counseling have been given  - Initial Wellness Visit - Includes PPPS ()  - metformin (GLUCOPHAGE) 500 MG Tab; Take 1 Tab by mouth 2 times a day.  Dispense: 60 Tab; Refill: 11    7. History of ovarian cancer  Stable no change to treatment.  - Initial Wellness Visit - Includes PPPS ()    8. PVD (peripheral vascular disease) (CMS-HCC)  Currently stable continue working down risk factors.  - Initial Wellness Visit - Includes PPPS ()    9. Chronic bilateral low back pain without sciatica  Clinically stable  - Initial Wellness Visit - Includes PPPS ()    10. Severe episode of recurrent major depressive disorder, without psychotic features (CMS-HCC)  Identified is depressed today will see back in 2 weeks to address blood sugars blood pressure and depression.  - Initial Wellness Visit - Includes PPPS ()    11. At risk for falls  Precautions given  - Patient identified as fall risk.  Appropriate orders and counseling given.  - Initial Wellness Visit - Includes PPPS ()    12. Influenza vaccine needed    - INFLUENZA VACCINE, HIGH DOSE (65+ ONLY)    13. Need for pneumococcal vaccine    - PNEUMOCOCCAL CONJUGATE VACCINE 13-VALENT    14. Screening, lipid    - COMP METABOLIC PANEL; Future  - LIPID PROFILE; Future    Over 40 minutes was spent with the patient of which over 50% of the time was spent with face-to-face patient education and care coordination.

## 2017-10-15 LAB — TEST NAME 95000: NORMAL

## 2017-10-18 ENCOUNTER — HOSPITAL ENCOUNTER (OUTPATIENT)
Dept: LAB | Facility: MEDICAL CENTER | Age: 67
End: 2017-10-18
Attending: INTERNAL MEDICINE
Payer: MEDICARE

## 2017-10-18 DIAGNOSIS — Z13.220 SCREENING, LIPID: ICD-10-CM

## 2017-10-18 LAB
ALBUMIN SERPL BCP-MCNC: 3.7 G/DL (ref 3.2–4.9)
ALBUMIN/GLOB SERPL: 1.3 G/DL
ALP SERPL-CCNC: 78 U/L (ref 30–99)
ALT SERPL-CCNC: 18 U/L (ref 2–50)
ANION GAP SERPL CALC-SCNC: 10 MMOL/L (ref 0–11.9)
AST SERPL-CCNC: 18 U/L (ref 12–45)
BILIRUB SERPL-MCNC: 1.6 MG/DL (ref 0.1–1.5)
BUN SERPL-MCNC: 20 MG/DL (ref 8–22)
CALCIUM SERPL-MCNC: 9 MG/DL (ref 8.5–10.5)
CHLORIDE SERPL-SCNC: 104 MMOL/L (ref 96–112)
CHOLEST SERPL-MCNC: 190 MG/DL (ref 100–199)
CO2 SERPL-SCNC: 22 MMOL/L (ref 20–33)
CREAT SERPL-MCNC: 0.81 MG/DL (ref 0.5–1.4)
GFR SERPL CREATININE-BSD FRML MDRD: >60 ML/MIN/1.73 M 2
GLOBULIN SER CALC-MCNC: 2.9 G/DL (ref 1.9–3.5)
GLUCOSE SERPL-MCNC: 269 MG/DL (ref 65–99)
HDLC SERPL-MCNC: 46 MG/DL
LDLC SERPL CALC-MCNC: 116 MG/DL
POTASSIUM SERPL-SCNC: 4.1 MMOL/L (ref 3.6–5.5)
PROT SERPL-MCNC: 6.6 G/DL (ref 6–8.2)
SODIUM SERPL-SCNC: 136 MMOL/L (ref 135–145)
TRIGL SERPL-MCNC: 139 MG/DL (ref 0–149)

## 2017-10-18 PROCEDURE — 80053 COMPREHEN METABOLIC PANEL: CPT

## 2017-10-18 PROCEDURE — 80061 LIPID PANEL: CPT

## 2017-10-18 PROCEDURE — 36415 COLL VENOUS BLD VENIPUNCTURE: CPT

## 2017-10-24 ENCOUNTER — OFFICE VISIT (OUTPATIENT)
Dept: MEDICAL GROUP | Facility: MEDICAL CENTER | Age: 67
End: 2017-10-24
Payer: MEDICARE

## 2017-10-24 VITALS
HEART RATE: 73 BPM | OXYGEN SATURATION: 94 % | HEIGHT: 62 IN | WEIGHT: 220.6 LBS | RESPIRATION RATE: 16 BRPM | DIASTOLIC BLOOD PRESSURE: 72 MMHG | BODY MASS INDEX: 40.59 KG/M2 | TEMPERATURE: 97.9 F | SYSTOLIC BLOOD PRESSURE: 132 MMHG

## 2017-10-24 DIAGNOSIS — E78.5 DYSLIPIDEMIA: ICD-10-CM

## 2017-10-24 DIAGNOSIS — E11.51 TYPE 2 DIABETES MELLITUS WITH DIABETIC PERIPHERAL ANGIOPATHY WITHOUT GANGRENE, WITHOUT LONG-TERM CURRENT USE OF INSULIN (HCC): ICD-10-CM

## 2017-10-24 DIAGNOSIS — E04.2 MULTIPLE THYROID NODULES: ICD-10-CM

## 2017-10-24 DIAGNOSIS — J45.40 MODERATE PERSISTENT ASTHMA WITHOUT COMPLICATION: ICD-10-CM

## 2017-10-24 DIAGNOSIS — I10 ESSENTIAL HYPERTENSION: ICD-10-CM

## 2017-10-24 PROCEDURE — 99214 OFFICE O/P EST MOD 30 MIN: CPT | Performed by: INTERNAL MEDICINE

## 2017-10-24 RX ORDER — ATORVASTATIN CALCIUM 20 MG/1
20 TABLET, FILM COATED ORAL DAILY
Qty: 90 TAB | Refills: 3 | Status: SHIPPED | OUTPATIENT
Start: 2017-10-24 | End: 2018-09-30 | Stop reason: SDUPTHER

## 2017-10-24 NOTE — PROGRESS NOTES
CC: Follow-up multiple issues    HPI:   Jessica presents today with the following.    1. Essential hypertension  Last visit started on amlodipine 5 mg she is tolerating well denies any chest pain shortness of breath no edema. Does not check blood pressures at home.    2. Type 2 diabetes mellitus with diabetic peripheral angiopathy without gangrene, without long-term current use of insulin (CMS-HCC)  Recently increased on Amaryl reports blood sugars are running better at home. Not yet due for repeat A1c but denying hypoglycemia.    3. Dyslipidemia  Currently not on a statin she is willing to try denies any history of medication intolerance.    4. Multiple thyroid nodules  Multiple thyroid nodules in the past was referred to ENT but never made the appointment. She is coming due for repeat imaging and is wanting to see an ear nose and throat specialist.    5. Moderate persistent asthma without complication  She does suffer from asthma and requests a new nebulizer. She is compliant with medications over nebulizer recently broke she does have problems with smoking environments.      Patient Active Problem List    Diagnosis Date Noted   • Dyslipidemia 10/24/2017   • Obesity (BMI 35.0-39.9 without comorbidity) 10/02/2017   • Severe nonproliferative diabetic retinopathy of both eyes associated with type 2 diabetes mellitus (CMS-HCC) 10/02/2017   • Microalbuminuria 10/02/2017   • Severe episode of recurrent major depressive disorder, without psychotic features (CMS-HCC) 10/02/2017   • History of ovarian cancer 05/06/2016   • Chronic low back pain without sciatica 05/06/2016   • Multiple thyroid nodules 09/04/2015   • Essential hypertension 09/04/2015   • Chronic wound of extremity 08/26/2015   • Type 2 diabetes mellitus with diabetic peripheral angiopathy without gangrene, without long-term current use of insulin (CMS-HCC) 08/26/2015   • PVD (peripheral vascular disease) (CMS-HCC) 08/26/2015       Current Outpatient  "Prescriptions   Medication Sig Dispense Refill   • NON SPECIFIED Nebulizer 1 Each 0p   • atorvastatin (LIPITOR) 20 MG Tab Take 1 Tab by mouth every day. 90 Tab 3   • glimepiride (AMARYL) 4 MG Tab Take 1 Tab by mouth 2 times a day. 60 Tab 3   • losartan (COZAAR) 100 MG Tab Take 1 Tab by mouth every day. 30 Tab 11   • Blood Glucose Monitoring Suppl Supplies Misc Test strips order: Test strips for Contour Next  meter. Sig: use bid and prn ssx high or low sugar #100 RF x 3 100 Each 11   • Blood Glucose Monitoring Suppl Supplies Misc Test strips order: Test strips for contour next meter. Sig: use bid 100 Each 11   • metformin (GLUCOPHAGE) 500 MG Tab Take 1 Tab by mouth 2 times a day. 60 Tab 11   • ondansetron (ZOFRAN) 4 MG Tab tablet Take 1 Tab by mouth every four hours as needed for Nausea/Vomiting. 20 Tab 6   • Lancets Misc Lancets order: Lancets for one touch meter. Sig: use bid and prn ssx high or low sugar. #100 RF x 3 100 Each 11   • Blood Glucose Monitoring Suppl Device Meter: Dispense one touch meter. Sig. Use as directed for blood sugar monitoring. #1. NR. 1 Device 0   • albuterol (PROVENTIL) 2.5mg/3ml Nebu Soln solution for nebulization 3 mL by Nebulization route every 6 hours as needed for Shortness of Breath. 75 mL 0   • albuterol (VENTOLIN OR PROVENTIL) 108 (90 BASE) MCG/ACT Aero Soln inhalation aerosol Inhale 2 Puffs by mouth every 6 hours as needed for Shortness of Breath. 8.5 g 3     No current facility-administered medications for this visit.          Allergies as of 10/24/2017 - Reviewed 10/24/2017   Allergen Reaction Noted   • Pcn [penicillins] Anaphylaxis 08/21/2015   • Rocephin [ceftriaxone sodium-lidocaine] Anaphylaxis 08/21/2015        ROS: As per HPI.    /72   Pulse 73   Temp 36.6 °C (97.9 °F)   Resp 16   Ht 1.575 m (5' 2\")   Wt 100.1 kg (220 lb 9.6 oz)   SpO2 94%   BMI 40.35 kg/m²     Physical Exam:  Gen:         Alert and oriented, No apparent distress.  Neck:        No " Lymphadenopathy or Bruits.  Lungs:     Clear to auscultation bilaterally  CV:          Regular rate and rhythm. No murmurs, rubs or gallops.               Ext:          No clubbing, cyanosis, edema.      Assessment and Plan.   67 y.o. female with the following issues.    1. Essential hypertension  Currently well controlled, Discuss diet, exercise and salt restriction.    2. Type 2 diabetes mellitus with diabetic peripheral angiopathy without gangrene, without long-term current use of insulin (CMS-HCC)  Reportedly doing better recheck A1c next lab visit.  - HEMOGLOBIN A1C; Future  - Diabetic Monofilament Lower Extremity Exam    3. Dyslipidemia  Have start on statin caution about side effects recheck next visit.  - atorvastatin (LIPITOR) 20 MG Tab; Take 1 Tab by mouth every day.  Dispense: 90 Tab; Refill: 3  - COMP METABOLIC PANEL; Future  - LIPID PROFILE; Future    4. Multiple thyroid nodules  Repeat ultrasound for referral to ENT  - US-SOFT TISSUES OF HEAD - NECK; Future  - REFERRAL TO ENT    5. Moderate persistent asthma without complication  Have written for a prescription for nebulizer she does not need refills of medication as of yet.  - NON SPECIFIED; Nebulizer  Dispense: 1 Each; Refill: 0p

## 2017-10-26 ENCOUNTER — HOSPITAL ENCOUNTER (OUTPATIENT)
Dept: RADIOLOGY | Facility: MEDICAL CENTER | Age: 67
End: 2017-10-26
Attending: INTERNAL MEDICINE
Payer: MEDICARE

## 2017-10-26 DIAGNOSIS — E04.2 MULTIPLE THYROID NODULES: ICD-10-CM

## 2017-10-26 PROCEDURE — 76536 US EXAM OF HEAD AND NECK: CPT

## 2017-11-27 ENCOUNTER — APPOINTMENT (OUTPATIENT)
Dept: HEALTH INFORMATION MANAGEMENT | Facility: MEDICAL CENTER | Age: 67
End: 2017-11-27
Payer: MEDICARE

## 2017-11-30 ENCOUNTER — APPOINTMENT (OUTPATIENT)
Dept: HEALTH INFORMATION MANAGEMENT | Facility: MEDICAL CENTER | Age: 67
End: 2017-11-30
Payer: MEDICARE

## 2017-12-19 DIAGNOSIS — E11.51 TYPE 2 DIABETES MELLITUS WITH DIABETIC PERIPHERAL ANGIOPATHY WITHOUT GANGRENE, WITHOUT LONG-TERM CURRENT USE OF INSULIN (HCC): ICD-10-CM

## 2017-12-19 RX ORDER — GLIMEPIRIDE 4 MG/1
4 TABLET ORAL 2 TIMES DAILY
Qty: 60 TAB | Refills: 11 | Status: SHIPPED | OUTPATIENT
Start: 2017-12-19 | End: 2018-01-17

## 2018-01-17 ENCOUNTER — HOSPITAL ENCOUNTER (OUTPATIENT)
Facility: MEDICAL CENTER | Age: 68
End: 2018-01-17
Attending: PHYSICIAN ASSISTANT
Payer: MEDICARE

## 2018-01-17 ENCOUNTER — OFFICE VISIT (OUTPATIENT)
Dept: URGENT CARE | Facility: CLINIC | Age: 68
End: 2018-01-17
Payer: MEDICARE

## 2018-01-17 VITALS
TEMPERATURE: 97.6 F | WEIGHT: 218 LBS | BODY MASS INDEX: 40.12 KG/M2 | DIASTOLIC BLOOD PRESSURE: 88 MMHG | SYSTOLIC BLOOD PRESSURE: 158 MMHG | HEIGHT: 62 IN | OXYGEN SATURATION: 94 % | RESPIRATION RATE: 16 BRPM | HEART RATE: 74 BPM

## 2018-01-17 DIAGNOSIS — L08.9 SKIN INFECTION: ICD-10-CM

## 2018-01-17 PROCEDURE — 99000 SPECIMEN HANDLING OFFICE-LAB: CPT | Performed by: PHYSICIAN ASSISTANT

## 2018-01-17 PROCEDURE — 87186 SC STD MICRODIL/AGAR DIL: CPT

## 2018-01-17 PROCEDURE — 87070 CULTURE OTHR SPECIMN AEROBIC: CPT

## 2018-01-17 PROCEDURE — 87077 CULTURE AEROBIC IDENTIFY: CPT

## 2018-01-17 PROCEDURE — 99214 OFFICE O/P EST MOD 30 MIN: CPT | Performed by: PHYSICIAN ASSISTANT

## 2018-01-17 RX ORDER — SULFAMETHOXAZOLE AND TRIMETHOPRIM 800; 160 MG/1; MG/1
1 TABLET ORAL 2 TIMES DAILY
Qty: 20 TAB | Refills: 1 | Status: SHIPPED | OUTPATIENT
Start: 2018-01-17 | End: 2018-01-27

## 2018-01-17 RX ORDER — TRAMADOL HYDROCHLORIDE 50 MG/1
50-100 TABLET ORAL EVERY 6 HOURS PRN
Qty: 30 TAB | Refills: 1 | Status: SHIPPED | OUTPATIENT
Start: 2018-01-17 | End: 2018-01-22

## 2018-01-17 ASSESSMENT — ENCOUNTER SYMPTOMS
JOINT SWELLING: 0
WEAKNESS: 0
NEUROLOGICAL NEGATIVE: 1
SWOLLEN GLANDS: 0
CONSTITUTIONAL NEGATIVE: 1
NUMBNESS: 0
MUSCULOSKELETAL NEGATIVE: 1
FEVER: 0

## 2018-01-17 NOTE — PROGRESS NOTES
"Subjective:      Jessica Cueto is a 67 y.o. female who presents with Wound Infection (x 1 wk,  wounds on Lt. lower leg, burning and redness)            Wound Infection   This is a new problem. The current episode started in the past 7 days (L leg skin infections). The problem occurs constantly. The problem has been unchanged. Pertinent negatives include no fever, joint swelling, numbness, swollen glands or weakness. Nothing aggravates the symptoms. She has tried nothing for the symptoms. The treatment provided no relief.       Review of Systems   Constitutional: Negative.  Negative for fever.   Musculoskeletal: Negative.  Negative for joint swelling.   Skin: Negative.    Neurological: Negative.  Negative for weakness and numbness.          Objective:     /88   Pulse 74   Temp 36.4 °C (97.6 °F)   Resp 16   Ht 1.575 m (5' 2\")   Wt 98.9 kg (218 lb)   SpO2 94%   BMI 39.87 kg/m²      Physical Exam   Constitutional: She is oriented to person, place, and time. She appears well-developed and well-nourished. No distress.   Musculoskeletal: Normal range of motion. She exhibits tenderness (L inner lower leg, 2 areas of scab/abr over achilles and inner ankle). She exhibits no edema.   Neurological: She is alert and oriented to person, place, and time. No sensory deficit. She exhibits normal muscle tone. Coordination and gait normal.   Skin: Skin is warm and dry. Capillary refill takes less than 2 seconds. There is erythema.   Psychiatric: She has a normal mood and affect. Her behavior is normal.   Nursing note and vitals reviewed.    Vitals:    01/17/18 1157 01/17/18 1203   BP: 158/88 158/88   Pulse: 74    Resp: 16    Temp: 36.4 °C (97.6 °F)    SpO2: 94%    Weight: 98.9 kg (218 lb)    Height: 1.575 m (5' 2\")      Active Ambulatory Problems     Diagnosis Date Noted   • Chronic wound of extremity 08/26/2015   • Type 2 diabetes mellitus with diabetic peripheral angiopathy without gangrene, without long-term current " use of insulin (CMS-HCC) 08/26/2015   • PVD (peripheral vascular disease) (CMS-HCC) 08/26/2015   • Multiple thyroid nodules 09/04/2015   • Essential hypertension 09/04/2015   • History of ovarian cancer 05/06/2016   • Chronic low back pain without sciatica 05/06/2016   • Obesity (BMI 35.0-39.9 without comorbidity) 10/02/2017   • Severe nonproliferative diabetic retinopathy of both eyes associated with type 2 diabetes mellitus (CMS-HCC) 10/02/2017   • Microalbuminuria 10/02/2017   • Severe episode of recurrent major depressive disorder, without psychotic features (CMS-HCC) 10/02/2017   • Dyslipidemia 10/24/2017     Resolved Ambulatory Problems     Diagnosis Date Noted   • Open wound of lower leg 08/26/2015   • Wound infection 09/04/2015     Past Medical History:   Diagnosis Date   • Asthma    • Cancer (CMS-HCC) 1995   • Diabetes (CMS-HCC)    • Disease of small blood vessels 2012     Current Outpatient Prescriptions on File Prior to Visit   Medication Sig Dispense Refill   • glimepiride (AMARYL) 4 MG Tab Take 1 Tab by mouth 2 times a day. 60 Tab 11   • atorvastatin (LIPITOR) 20 MG Tab Take 1 Tab by mouth every day. 90 Tab 3   • losartan (COZAAR) 100 MG Tab Take 1 Tab by mouth every day. 30 Tab 11   • Blood Glucose Monitoring Suppl Supplies Misc Test strips order: Test strips for Contour Next  meter. Sig: use bid and prn ssx high or low sugar #100 RF x 3 100 Each 11   • Blood Glucose Monitoring Suppl Supplies Misc Test strips order: Test strips for contour next meter. Sig: use bid 100 Each 11   • metformin (GLUCOPHAGE) 500 MG Tab Take 1 Tab by mouth 2 times a day. 60 Tab 11   • Lancets Misc Lancets order: Lancets for one touch meter. Sig: use bid and prn ssx high or low sugar. #100 RF x 3 100 Each 11   • Blood Glucose Monitoring Suppl Device Meter: Dispense one touch meter. Sig. Use as directed for blood sugar monitoring. #1. NR. 1 Device 0   • albuterol (PROVENTIL) 2.5mg/3ml Nebu Soln solution for nebulization 3 mL by  Nebulization route every 6 hours as needed for Shortness of Breath. 75 mL 0   • albuterol (VENTOLIN OR PROVENTIL) 108 (90 BASE) MCG/ACT Aero Soln inhalation aerosol Inhale 2 Puffs by mouth every 6 hours as needed for Shortness of Breath. 8.5 g 3   • glimepiride (AMARYL) 4 MG Tab TAKE 1 TAB BY MOUTH 2 TIMES A DAY. 60 Tab 11   • NON SPECIFIED Nebulizer 1 Each 0p   • ondansetron (ZOFRAN) 4 MG Tab tablet Take 1 Tab by mouth every four hours as needed for Nausea/Vomiting. (Patient not taking: Reported on 1/17/2018) 20 Tab 6     No current facility-administered medications on file prior to visit.      Social History     Social History   • Marital status:      Spouse name: N/A   • Number of children: N/A   • Years of education: N/A     Occupational History   • Not on file.     Social History Main Topics   • Smoking status: Former Smoker     Packs/day: 0.50     Years: 19.00     Types: Cigarettes     Quit date: 1/15/1992   • Smokeless tobacco: Never Used      Comment: started smoking at age 23   • Alcohol use No   • Drug use:      Types: Marijuana      Comment: marijuana cream for pain in foot   • Sexual activity: No     Other Topics Concern   • Not on file     Social History Narrative   • No narrative on file     Family History   Problem Relation Age of Onset   • Diabetes Mother    • Heart Disease Mother    • Heart Attack Mother      multiple   • Heart Disease Father    • Alzheimer's Disease Father    • Stroke Sister    • Heart Disease Sister    • Lung Disease Sister    • Hypertension Sister    • Diabetes Brother      Bilat little toe amputation   • Hypertension Brother    • Heart Disease Brother      vascular   • No Known Problems Sister    • Heart Attack Maternal Grandmother    • Heart Attack Maternal Grandfather    • No Known Problems Paternal Grandmother    • No Known Problems Paternal Grandfather      Pcn [penicillins] and Rocephin [ceftriaxone sodium-lidocaine]              Assessment/Plan:     ·  leg  infection      · rx abx; Wound refer.

## 2018-01-18 DIAGNOSIS — L08.9 SKIN INFECTION: ICD-10-CM

## 2018-01-20 LAB
BACTERIA WND AEROBE CULT: ABNORMAL
BACTERIA WND AEROBE CULT: ABNORMAL
SIGNIFICANT IND 70042: ABNORMAL
SITE SITE: ABNORMAL
SOURCE SOURCE: ABNORMAL

## 2018-04-17 PROBLEM — E66.01 MORBID OBESITY WITH BMI OF 40.0-44.9, ADULT (HCC): Status: ACTIVE | Noted: 2017-10-02

## 2018-06-01 ENCOUNTER — PATIENT OUTREACH (OUTPATIENT)
Dept: HEALTH INFORMATION MANAGEMENT | Facility: OTHER | Age: 68
End: 2018-06-01

## 2018-06-01 NOTE — PROGRESS NOTES
Outcome: Left Message    Please transfer to Patient Outreach Team at [phone] when patient returns call.    WebIZ Checked & Epic Updated:  yes    [facility] Verified: yes    Attempt # 1

## 2018-07-07 ENCOUNTER — OFFICE VISIT (OUTPATIENT)
Dept: URGENT CARE | Facility: CLINIC | Age: 68
End: 2018-07-07
Payer: MEDICARE

## 2018-07-07 VITALS
OXYGEN SATURATION: 92 % | RESPIRATION RATE: 20 BRPM | HEIGHT: 63 IN | HEART RATE: 94 BPM | SYSTOLIC BLOOD PRESSURE: 140 MMHG | BODY MASS INDEX: 37.56 KG/M2 | WEIGHT: 212 LBS | TEMPERATURE: 98.2 F | DIASTOLIC BLOOD PRESSURE: 90 MMHG

## 2018-07-07 DIAGNOSIS — H65.111 ACUTE MUCOID OTITIS MEDIA OF RIGHT EAR: ICD-10-CM

## 2018-07-07 DIAGNOSIS — J02.9 PHARYNGITIS, UNSPECIFIED ETIOLOGY: ICD-10-CM

## 2018-07-07 DIAGNOSIS — H69.91 ETD (EUSTACHIAN TUBE DYSFUNCTION), RIGHT: ICD-10-CM

## 2018-07-07 PROCEDURE — 99214 OFFICE O/P EST MOD 30 MIN: CPT | Performed by: PHYSICIAN ASSISTANT

## 2018-07-07 RX ORDER — AZITHROMYCIN 250 MG/1
TABLET, FILM COATED ORAL
Qty: 6 TAB | Refills: 1 | Status: SHIPPED | OUTPATIENT
Start: 2018-07-07 | End: 2018-08-23

## 2018-07-07 RX ORDER — METHYLPREDNISOLONE 4 MG/1
TABLET ORAL
Qty: 1 TAB | Refills: 0 | Status: SHIPPED | OUTPATIENT
Start: 2018-07-07 | End: 2018-08-23

## 2018-07-07 ASSESSMENT — ENCOUNTER SYMPTOMS
COUGH: 0
SORE THROAT: 1
RESPIRATORY NEGATIVE: 1
GASTROINTESTINAL NEGATIVE: 1
CONSTITUTIONAL NEGATIVE: 1
CARDIOVASCULAR NEGATIVE: 1
NEUROLOGICAL NEGATIVE: 1
EYES NEGATIVE: 1

## 2018-07-07 NOTE — PROGRESS NOTES
"Subjective:      Jessica Cueto is a 67 y.o. female who presents with Otalgia (right ear pain, right throat pain, sob)            Otalgia    There is pain in the right (st, ear pn) ear. This is a new problem. The current episode started in the past 7 days. The problem occurs constantly. The problem has been unchanged. There has been no fever. The pain is moderate. Associated symptoms include a sore throat. Pertinent negatives include no coughing, ear discharge or hearing loss. She has tried nothing for the symptoms. The treatment provided no relief. There is no history of a chronic ear infection.       Review of Systems   Constitutional: Negative.    HENT: Positive for ear pain and sore throat. Negative for ear discharge and hearing loss.    Eyes: Negative.    Respiratory: Negative.  Negative for cough.    Cardiovascular: Negative.    Gastrointestinal: Negative.    Skin: Negative.    Neurological: Negative.    All other systems reviewed and are negative.         Objective:     /90   Pulse 94   Temp 36.8 °C (98.2 °F)   Resp 20   Ht 1.588 m (5' 2.5\")   Wt 96.2 kg (212 lb)   SpO2 92%   BMI 38.16 kg/m²      Physical Exam   Constitutional: She is oriented to person, place, and time. She appears well-developed and well-nourished. No distress.   HENT:   Head: Normocephalic and atraumatic.   Nose: Nose normal.   Mouth/Throat: No oropharyngeal exudate.   +phar./tons redn; +redn r tm     Eyes: Conjunctivae and EOM are normal. Pupils are equal, round, and reactive to light.   Neck: Normal range of motion. Neck supple.   Cardiovascular: Normal rate and regular rhythm.    Pulmonary/Chest: Effort normal and breath sounds normal. No respiratory distress.   Lymphadenopathy:     She has cervical adenopathy (+submandib. adenop).   Neurological: She is alert and oriented to person, place, and time.   Skin: Skin is warm and dry. No rash noted.   Psychiatric: She has a normal mood and affect. Her behavior is normal. "   Nursing note and vitals reviewed.    Active Ambulatory Problems     Diagnosis Date Noted   • Chronic wound of extremity 08/26/2015   • Type 2 diabetes mellitus with diabetic peripheral angiopathy without gangrene, without long-term current use of insulin ([facility]) 08/26/2015   • PVD (peripheral vascular disease) ([facility]) 08/26/2015   • Multiple thyroid nodules 09/04/2015   • Essential hypertension 09/04/2015   • History of ovarian cancer 05/06/2016   • Chronic low back pain without sciatica 05/06/2016   • Morbid obesity with BMI of 40.0-44.9, adult ([facility]) 10/02/2017   • Severe nonproliferative diabetic retinopathy of both eyes associated with type 2 diabetes mellitus ([facility]) 10/02/2017   • Microalbuminuria 10/02/2017   • Severe episode of recurrent major depressive disorder, without psychotic features ([facility]) 10/02/2017   • Dyslipidemia 10/24/2017     Resolved Ambulatory Problems     Diagnosis Date Noted   • Open wound of lower leg 08/26/2015   • Wound infection 09/04/2015     Past Medical History:   Diagnosis Date   • Asthma    • Cancer ([facility]) 1995   • Diabetes ([facility])    • Disease of small blood vessels 2012     Current Outpatient Prescriptions on File Prior to Visit   Medication Sig Dispense Refill   • atorvastatin (LIPITOR) 20 MG Tab Take 1 Tab by mouth every day. 90 Tab 3   • losartan (COZAAR) 100 MG Tab Take 1 Tab by mouth every day. 30 Tab 11   • Blood Glucose Monitoring Suppl Supplies Misc Test strips order: Test strips for Contour Next  meter. Sig: use bid and prn ssx high or low sugar #100 RF x 3 100 Each 11   • Blood Glucose Monitoring Suppl Supplies Misc Test strips order: Test strips for contour next meter. Sig: use bid 100 Each 11   • metformin (GLUCOPHAGE) 500 MG Tab Take 1 Tab by mouth 2 times a day. 60 Tab 11   • Lancets Misc Lancets order: Lancets for one touch meter. Sig: use bid and prn ssx high or low sugar. #100 RF x 3 100 Each 11   • Blood Glucose Monitoring Suppl Device Meter: Dispense one touch meter.  Sig. Use as directed for blood sugar monitoring. #1. NR. 1 Device 0   • albuterol (PROVENTIL) 2.5mg/3ml Nebu Soln solution for nebulization 3 mL by Nebulization route every 6 hours as needed for Shortness of Breath. 75 mL 0   • albuterol (VENTOLIN OR PROVENTIL) 108 (90 BASE) MCG/ACT Aero Soln inhalation aerosol Inhale 2 Puffs by mouth every 6 hours as needed for Shortness of Breath. 8.5 g 3   • mupirocin (BACTROBAN) 2 % Ointment Apply bid to affected skin area(s) 30 g 1   • Lidocaine 2 % Gel 1 Application by Apply externally route as needed. 1 Tube 1   • glimepiride (AMARYL) 4 MG Tab TAKE 1 TAB BY MOUTH 2 TIMES A DAY. 60 Tab 11   • NON SPECIFIED Nebulizer 1 Each 0p   • ondansetron (ZOFRAN) 4 MG Tab tablet Take 1 Tab by mouth every four hours as needed for Nausea/Vomiting. (Patient not taking: Reported on 1/17/2018) 20 Tab 6     No current facility-administered medications on file prior to visit.      Social History     Social History   • Marital status:      Spouse name: N/A   • Number of children: N/A   • Years of education: N/A     Occupational History   • Not on file.     Social History Main Topics   • Smoking status: Former Smoker     Packs/day: 0.50     Years: 19.00     Types: Cigarettes     Quit date: 1/15/1992   • Smokeless tobacco: Never Used      Comment: started smoking at age 23   • Alcohol use No   • Drug use: Yes     Types: Marijuana      Comment: marijuana cream for pain in foot   • Sexual activity: No     Other Topics Concern   • Not on file     Social History Narrative   • No narrative on file     Family History   Problem Relation Age of Onset   • Diabetes Mother    • Heart Disease Mother    • Heart Attack Mother      multiple   • Heart Disease Father    • Alzheimer's Disease Father    • Stroke Sister    • Heart Disease Sister    • Lung Disease Sister    • Hypertension Sister    • Diabetes Brother      Bilat little toe amputation   • Hypertension Brother    • Heart Disease Brother       vascular   • No Known Problems Sister    • Heart Attack Maternal Grandmother    • Heart Attack Maternal Grandfather    • No Known Problems Paternal Grandmother    • No Known Problems Paternal Grandfather      Pcn [penicillins] and Rocephin [ceftriaxone sodium-lidocaine]              Assessment/Plan:     ·  st, otalgia      · rx meds; otc prn

## 2018-07-12 ENCOUNTER — OFFICE VISIT (OUTPATIENT)
Dept: URGENT CARE | Facility: CLINIC | Age: 68
End: 2018-07-12
Payer: MEDICARE

## 2018-07-12 VITALS
WEIGHT: 212 LBS | RESPIRATION RATE: 16 BRPM | BODY MASS INDEX: 37.56 KG/M2 | DIASTOLIC BLOOD PRESSURE: 82 MMHG | TEMPERATURE: 98.7 F | HEIGHT: 63 IN | SYSTOLIC BLOOD PRESSURE: 144 MMHG | OXYGEN SATURATION: 95 % | HEART RATE: 88 BPM

## 2018-07-12 DIAGNOSIS — R09.81 SINUS CONGESTION: ICD-10-CM

## 2018-07-12 DIAGNOSIS — R05.9 COUGH: ICD-10-CM

## 2018-07-12 DIAGNOSIS — J03.90 TONSILLITIS: ICD-10-CM

## 2018-07-12 PROCEDURE — 99214 OFFICE O/P EST MOD 30 MIN: CPT | Performed by: PHYSICIAN ASSISTANT

## 2018-07-12 RX ORDER — METRONIDAZOLE 250 MG/1
250 TABLET ORAL DAILY
Qty: 10 TAB | Refills: 0 | Status: SHIPPED | OUTPATIENT
Start: 2018-07-12 | End: 2018-07-22

## 2018-07-12 RX ORDER — LEVOFLOXACIN 500 MG/1
500 TABLET, FILM COATED ORAL DAILY
Qty: 10 TAB | Refills: 0 | Status: SHIPPED | OUTPATIENT
Start: 2018-07-12 | End: 2018-07-22

## 2018-07-12 ASSESSMENT — ENCOUNTER SYMPTOMS
RHINORRHEA: 1
EYES NEGATIVE: 1
CARDIOVASCULAR NEGATIVE: 1
CONSTITUTIONAL NEGATIVE: 1
HEMOPTYSIS: 0
FEVER: 0
SORE THROAT: 1
SHORTNESS OF BREATH: 0
WHEEZING: 0
COUGH: 1
MUSCULOSKELETAL NEGATIVE: 1
SINUS PAIN: 1

## 2018-07-12 ASSESSMENT — COPD QUESTIONNAIRES: COPD: 0

## 2018-07-12 NOTE — PROGRESS NOTES
"Subjective:      Jessica Cueto is a 67 y.o. female who presents with Congestion (congestion - feeling worse, was seen on Saturday)            Cough   This is a new (initially improved on zpak, now worsening cough, st., sinus/ear pn) problem. The current episode started in the past 7 days. The problem has been unchanged. The problem occurs every few minutes. The cough is productive of sputum. Associated symptoms include nasal congestion, rhinorrhea and a sore throat. Pertinent negatives include no fever, hemoptysis, shortness of breath or wheezing. Nothing aggravates the symptoms. Treatments tried: abx. The treatment provided moderate relief. There is no history of asthma or COPD.       Review of Systems   Constitutional: Negative.  Negative for fever.   HENT: Positive for congestion, rhinorrhea, sinus pain and sore throat.         +phar./tons redn  +maxill.sinus press.     Eyes: Negative.    Respiratory: Positive for cough. Negative for hemoptysis, shortness of breath and wheezing.    Cardiovascular: Negative.    Musculoskeletal: Negative.    Skin: Negative.           Objective:     /82   Pulse 88   Temp 37.1 °C (98.7 °F)   Resp 16   Ht 1.588 m (5' 2.52\")   Wt 96.2 kg (212 lb)   SpO2 95%   BMI 38.13 kg/m²      Physical Exam   Constitutional: She is oriented to person, place, and time. She appears well-developed and well-nourished. No distress.   HENT:   Head: Normocephalic and atraumatic.   Mouth/Throat: No oropharyngeal exudate.   +phar./tons redn  TMs clr     Eyes: Conjunctivae and EOM are normal. Pupils are equal, round, and reactive to light.   Neck: Normal range of motion. Neck supple.   Cardiovascular: Normal rate, regular rhythm and normal heart sounds.    Pulmonary/Chest: Effort normal and breath sounds normal. No respiratory distress. She has no wheezes. She has no rales.   Lymphadenopathy:     She has cervical adenopathy.   Neurological: She is alert and oriented to person, place, and time. "   Skin: Skin is warm and dry.   Psychiatric: She has a normal mood and affect. Her behavior is normal.   Nursing note and vitals reviewed.    Pharmacy     No notes of this type exist for this encounter.        Active Ambulatory Problems     Diagnosis Date Noted   • Chronic wound of extremity 08/26/2015   • Type 2 diabetes mellitus with diabetic peripheral angiopathy without gangrene, without long-term current use of insulin ([facility]) 08/26/2015   • PVD (peripheral vascular disease) ([facility]) 08/26/2015   • Multiple thyroid nodules 09/04/2015   • Essential hypertension 09/04/2015   • History of ovarian cancer 05/06/2016   • Chronic low back pain without sciatica 05/06/2016   • Morbid obesity with BMI of 40.0-44.9, adult ([facility]) 10/02/2017   • Severe nonproliferative diabetic retinopathy of both eyes associated with type 2 diabetes mellitus ([facility]) 10/02/2017   • Microalbuminuria 10/02/2017   • Severe episode of recurrent major depressive disorder, without psychotic features ([facility]) 10/02/2017   • Dyslipidemia 10/24/2017     Resolved Ambulatory Problems     Diagnosis Date Noted   • Open wound of lower leg 08/26/2015   • Wound infection 09/04/2015     Past Medical History:   Diagnosis Date   • Asthma    • Cancer ([facility]) 1995   • Diabetes ([facility])    • Disease of small blood vessels 2012     Current Outpatient Prescriptions on File Prior to Visit   Medication Sig Dispense Refill   • azithromycin (ZITHROMAX) 250 MG Tab z-ruth; U.D. [refill medicine if sx persist] 6 Tab 1   • MethylPREDNISolone (MEDROL DOSEPAK) 4 MG Tablet Therapy Pack U.D. 1 Tab 0   • neomycin sulf/polymyx B sulf/HC soln (CORTISPORIN HC SOL) 3.5-72785-8 Solution Place 4-6 Drops in ear 4 times a day. In affected ear 10 mL 0   • mupirocin (BACTROBAN) 2 % Ointment Apply bid to affected skin area(s) 30 g 1   • Lidocaine 2 % Gel 1 Application by Apply externally route as needed. 1 Tube 1   • glimepiride (AMARYL) 4 MG Tab TAKE 1 TAB BY MOUTH 2 TIMES A DAY. 60 Tab 11   • NON  SPECIFIED Nebulizer 1 Each 0p   • atorvastatin (LIPITOR) 20 MG Tab Take 1 Tab by mouth every day. 90 Tab 3   • losartan (COZAAR) 100 MG Tab Take 1 Tab by mouth every day. 30 Tab 11   • Blood Glucose Monitoring Suppl Supplies Misc Test strips order: Test strips for Contour Next  meter. Sig: use bid and prn ssx high or low sugar #100 RF x 3 100 Each 11   • Blood Glucose Monitoring Suppl Supplies Misc Test strips order: Test strips for contour next meter. Sig: use bid 100 Each 11   • metformin (GLUCOPHAGE) 500 MG Tab Take 1 Tab by mouth 2 times a day. 60 Tab 11   • ondansetron (ZOFRAN) 4 MG Tab tablet Take 1 Tab by mouth every four hours as needed for Nausea/Vomiting. (Patient not taking: Reported on 1/17/2018) 20 Tab 6   • Lancets Misc Lancets order: Lancets for one touch meter. Sig: use bid and prn ssx high or low sugar. #100 RF x 3 100 Each 11   • Blood Glucose Monitoring Suppl Device Meter: Dispense one touch meter. Sig. Use as directed for blood sugar monitoring. #1. NR. 1 Device 0   • albuterol (PROVENTIL) 2.5mg/3ml Nebu Soln solution for nebulization 3 mL by Nebulization route every 6 hours as needed for Shortness of Breath. 75 mL 0   • albuterol (VENTOLIN OR PROVENTIL) 108 (90 BASE) MCG/ACT Aero Soln inhalation aerosol Inhale 2 Puffs by mouth every 6 hours as needed for Shortness of Breath. 8.5 g 3     No current facility-administered medications on file prior to visit.      Social History     Social History   • Marital status:      Spouse name: N/A   • Number of children: N/A   • Years of education: N/A     Occupational History   • Not on file.     Social History Main Topics   • Smoking status: Former Smoker     Packs/day: 0.50     Years: 19.00     Types: Cigarettes     Quit date: 1/15/1992   • Smokeless tobacco: Never Used      Comment: started smoking at age 23   • Alcohol use No   • Drug use: Yes     Types: Marijuana      Comment: marijuana cream for pain in foot   • Sexual activity: No     Other  Topics Concern   • Not on file     Social History Narrative   • No narrative on file     Family History   Problem Relation Age of Onset   • Diabetes Mother    • Heart Disease Mother    • Heart Attack Mother      multiple   • Heart Disease Father    • Alzheimer's Disease Father    • Stroke Sister    • Heart Disease Sister    • Lung Disease Sister    • Hypertension Sister    • Diabetes Brother      Bilat little toe amputation   • Hypertension Brother    • Heart Disease Brother      vascular   • No Known Problems Sister    • Heart Attack Maternal Grandmother    • Heart Attack Maternal Grandfather    • No Known Problems Paternal Grandmother    • No Known Problems Paternal Grandfather      Pcn [penicillins] and Rocephin [ceftriaxone sodium-lidocaine]            Assessment/Plan:     ·  worsening cough, ST, ear pn      · Will change to levaquin rx; [flagyl 250qd ; pt already having loose stools , may help to prevent c.diff?]  ·  MBX

## 2018-08-09 ENCOUNTER — PATIENT OUTREACH (OUTPATIENT)
Dept: HEALTH INFORMATION MANAGEMENT | Facility: OTHER | Age: 68
End: 2018-08-09

## 2018-08-09 NOTE — PROGRESS NOTES
1. Attempt #:1    2. WebIZ Checked & Epic Updated: no  3. HealthConnect Verified: no  4. Verify PCP: yes    5. Communication Preference Obtained: yes    6. Diabetes Visit Scheduling  Scheduling Status:Not Scheduled. not due / SCP      7. Care Gap Scheduling (Attempt to Schedule EACH Overdue Care Gap!)    Health Maintenance Due   Topic Date Due   • IMM ZOSTER VACCINES (1 of 2) 08/14/1998   • COLONOSCOPY  05/03/2016   • A1C SCREENING  03/25/2018   • MAMMOGRAM  06/06/2018        8. Patient was directed to Health and Wellness Website: yes     - Patient plans to schedule appointment for Colonoscopy/FIT, Immunizations: SHINGRIX (Shingles) and Mammogram.    9. Screened for Food Pantry Prescription? yes  10. Schrodinger Activation: sent activation code  11. Schrodinger Lara: no  12. Virtual Visits: no  13. Opt In to Text Messages: yes

## 2018-08-09 NOTE — PROGRESS NOTES
Outcome: no answer no vm    Please transfer to Patient Outreach Team at [phone] when patient returns call.    WebIZ Checked & Epic Updated:  yes    HealthConnect Verified: yes    Attempt # 1

## 2018-08-23 ENCOUNTER — OFFICE VISIT (OUTPATIENT)
Dept: MEDICAL GROUP | Facility: PHYSICIAN GROUP | Age: 68
End: 2018-08-23
Payer: MEDICARE

## 2018-08-23 VITALS
HEART RATE: 88 BPM | TEMPERATURE: 97.7 F | SYSTOLIC BLOOD PRESSURE: 186 MMHG | HEIGHT: 63 IN | BODY MASS INDEX: 37.99 KG/M2 | WEIGHT: 214.4 LBS | DIASTOLIC BLOOD PRESSURE: 78 MMHG | RESPIRATION RATE: 18 BRPM | OXYGEN SATURATION: 90 %

## 2018-08-23 DIAGNOSIS — Z12.11 SCREENING FOR COLON CANCER: ICD-10-CM

## 2018-08-23 DIAGNOSIS — M25.562 CHRONIC PAIN OF BOTH KNEES: ICD-10-CM

## 2018-08-23 DIAGNOSIS — Z12.31 ENCOUNTER FOR SCREENING MAMMOGRAM FOR BREAST CANCER: ICD-10-CM

## 2018-08-23 DIAGNOSIS — M25.561 CHRONIC PAIN OF BOTH KNEES: ICD-10-CM

## 2018-08-23 DIAGNOSIS — E11.3493 SEVERE NONPROLIFERATIVE DIABETIC RETINOPATHY OF BOTH EYES ASSOCIATED WITH TYPE 2 DIABETES MELLITUS, MACULAR EDEMA PRESENCE UNSPECIFIED (HCC): ICD-10-CM

## 2018-08-23 DIAGNOSIS — E78.5 DYSLIPIDEMIA: ICD-10-CM

## 2018-08-23 DIAGNOSIS — E11.51 TYPE 2 DIABETES MELLITUS WITH DIABETIC PERIPHERAL ANGIOPATHY WITHOUT GANGRENE, WITHOUT LONG-TERM CURRENT USE OF INSULIN (HCC): ICD-10-CM

## 2018-08-23 DIAGNOSIS — G89.29 CHRONIC PAIN OF BOTH KNEES: ICD-10-CM

## 2018-08-23 DIAGNOSIS — Z23 NEED FOR VACCINATION: ICD-10-CM

## 2018-08-23 DIAGNOSIS — R80.9 MICROALBUMINURIA: ICD-10-CM

## 2018-08-23 DIAGNOSIS — I10 ESSENTIAL HYPERTENSION: ICD-10-CM

## 2018-08-23 DIAGNOSIS — E66.01 MORBID OBESITY WITH BMI OF 40.0-44.9, ADULT (HCC): ICD-10-CM

## 2018-08-23 LAB
HBA1C MFR BLD: 10.4 % (ref ?–5.8)
INT CON NEG: NEGATIVE
INT CON POS: POSITIVE

## 2018-08-23 PROCEDURE — 99215 OFFICE O/P EST HI 40 MIN: CPT | Performed by: PHYSICIAN ASSISTANT

## 2018-08-23 PROCEDURE — 83036 HEMOGLOBIN GLYCOSYLATED A1C: CPT | Performed by: PHYSICIAN ASSISTANT

## 2018-08-23 RX ORDER — GLIMEPIRIDE 4 MG/1
4 TABLET ORAL 2 TIMES DAILY
Qty: 60 TAB | Refills: 11 | Status: SHIPPED | OUTPATIENT
Start: 2018-08-23 | End: 2019-11-13 | Stop reason: SDUPTHER

## 2018-08-23 RX ORDER — HYDROCHLOROTHIAZIDE 25 MG/1
25 TABLET ORAL DAILY
Qty: 90 TAB | Refills: 0 | Status: SHIPPED | OUTPATIENT
Start: 2018-08-23 | End: 2018-11-18 | Stop reason: SDUPTHER

## 2018-08-23 ASSESSMENT — PAIN SCALES - GENERAL: PAINLEVEL: 8=MODERATE-SEVERE PAIN

## 2018-08-23 NOTE — ASSESSMENT & PLAN NOTE
Chronic problem.  Patient states for several years she has been experiencing a constant non radiating low-grade aching pain of medial aspects of bilateral knees.  States pain can develop into a sharp pain.  She tells me that she experiences a popping sensation with bending.  States her legs feel weak when getting up from a sitting position.  Occasionally wears knee braces for stability.  States she is experiencing sleep deprivation due to pain symptoms.  She tells me that pressure and heat from her hands help alleviate symptoms.  States she is used a TENS unit and CBD cream with no alleviation symptoms.  States she is also tried anti-inflammatories with no alleviation symptoms.  Patient is inquiring about OxyContin as a treatment option.  States 3 years ago she was prescribed OxyContin for bilateral knee pain symptoms while she lived in [place].  Discussed with patient that I will not be prescribing OxyContin during today's appointment.

## 2018-08-29 NOTE — ASSESSMENT & PLAN NOTE
Chronic but uncontrolled problem.  POCT Hemoglobin A1c 10.4.  Patient is currently prescribed metformin 500 mg twice daily and glimepiride 4 mg twice daily.  She tells me she has been out of glimepiride medication for 1 month.  Patient is requesting a refill during today's appointment.  Denies polyuria, polydipsia, poor wound healing, dizziness, syncope, unexplained confusion.  Patient has a positive medical history for nonproliferative diabetic retinopathy of both eyes associated with type 2 diabetes mellitus.  She tells me that she follows up with her ophthalmologist every 6 weeks for injections.

## 2018-08-29 NOTE — ASSESSMENT & PLAN NOTE
Admits diet could improve and denies having a regular exercise routine.  States due to chronic pain symptoms she is unable to exercise like she would like.

## 2018-08-29 NOTE — ASSESSMENT & PLAN NOTE
Last lipid profile lab work from 10/18/2017 with patient.  Results are as follows:  Cholesterol,Tot 190  100 - 199 mg/dL Final   Triglycerides 139  0 - 149 mg/dL Final   HDL 46  >=40 mg/dL Final      <100 mg/dL Final     Chronic stable problem.  Patient is currently taking atorvastatin 20 mg tab once every evening.  She tells me she is compliant with medication and experiences no side effects or complications from medication.  Denies myalgias. Will repeat lab work.

## 2018-08-29 NOTE — ASSESSMENT & PLAN NOTE
Chronic problem.  Patient's blood pressure during today's appointment 186/78 mmHg.  Patient is currently taking losartan 100 mg tablet once nightly.  She tells me she is compliant with medication and experiences no side effects or complications from medication.  Denies chest pain, heart palpitations, dizziness, syncope, shortness of breath, severe headache, vision changes or leg swelling.

## 2018-08-29 NOTE — PROGRESS NOTES
Chief Complaint   Patient presents with   • Knee Pain     right knee pain X 2 months  left knee pain        HISTORY OF PRESENT ILLNESS: Jessica Cueto is an established 68 y.o. female here to discuss the evaluation and management of:    Chronic pain of both knees  Chronic problem.  Patient states for several years she has been experiencing a constant non radiating low-grade aching pain of medial aspects of bilateral knees.  States pain can develop into a sharp pain.  She tells me that she experiences a popping sensation with bending.  States her legs feel weak when getting up from a sitting position.  Occasionally wears knee braces for stability.  States she is experiencing sleep deprivation due to pain symptoms.  She tells me that pressure and heat from her hands help alleviate symptoms.  States she is used a TENS unit and CBD cream with no alleviation symptoms.  States she is also tried anti-inflammatories with no alleviation symptoms.  Patient is inquiring about OxyContin as a treatment option.  States 3 years ago she was prescribed OxyContin for bilateral knee pain symptoms while she lived in [place].  Discussed with patient that I will not be prescribing OxyContin during today's appointment.        Dyslipidemia  Last lipid profile lab work from 10/18/2017 with patient.  Results are as follows:  Cholesterol,Tot 190  100 - 199 mg/dL Final   Triglycerides 139  0 - 149 mg/dL Final   HDL 46  >=40 mg/dL Final      <100 mg/dL Final     Chronic stable problem.  Patient is currently taking atorvastatin 20 mg tab once every evening.  She tells me she is compliant with medication and experiences no side effects or complications from medication.  Denies myalgias. Will repeat lab work.     Essential hypertension  Chronic problem.  Patient's blood pressure during today's appointment 186/78 mmHg.  Patient is currently taking losartan 100 mg tablet once nightly.  She tells me she is compliant with medication and  experiences no side effects or complications from medication.  Denies chest pain, heart palpitations, dizziness, syncope, shortness of breath, severe headache, vision changes or leg swelling.    Morbid obesity with BMI of 40.0-44.9, adult ([facility])  Admits diet could improve and denies having a regular exercise routine.  States due to chronic pain symptoms she is unable to exercise like she would like.    Microalbuminuria  Uncontrolled diabetes.  Chronic problem.  We will repeat lab work.    Type 2 diabetes mellitus with diabetic peripheral angiopathy without gangrene, without long-term current use of insulin (CMS-HCC) ([facility])  Chronic but uncontrolled problem.  POCT Hemoglobin A1c 10.4.  Patient is currently prescribed metformin 500 mg twice daily and glimepiride 4 mg twice daily.  She tells me she has been out of glimepiride medication for 1 month.  Patient is requesting a refill during today's appointment.  Denies polyuria, polydipsia, poor wound healing, dizziness, syncope, unexplained confusion.  Positive for neuropathy.  Patient has a positive medical history for nonproliferative diabetic retinopathy of both eyes associated with type 2 diabetes mellitus.  She tells me that she follows up with her ophthalmologist every 6 weeks for injections.       Patient Active Problem List    Diagnosis Date Noted   • Chronic pain of both knees 08/23/2018   • Dyslipidemia 10/24/2017   • Morbid obesity with BMI of 40.0-44.9, adult ([facility]) 10/02/2017   • Severe nonproliferative diabetic retinopathy of both eyes associated with type 2 diabetes mellitus ([facility]) 10/02/2017   • Microalbuminuria 10/02/2017   • Severe episode of recurrent major depressive disorder, without psychotic features ([facility]) 10/02/2017   • History of ovarian cancer 05/06/2016   • Chronic low back pain without sciatica 05/06/2016   • Multiple thyroid nodules 09/04/2015   • Essential hypertension 09/04/2015   • Chronic wound of extremity 08/26/2015   • Type 2 diabetes  mellitus with diabetic peripheral angiopathy without gangrene, without long-term current use of insulin ([facility]) 08/26/2015   • PVD (peripheral vascular disease) ([facility]) 08/26/2015       Allergies:Pcn [penicillins] and Rocephin [ceftriaxone sodium-lidocaine]    Current Outpatient Prescriptions   Medication Sig Dispense Refill   • metformin (GLUCOPHAGE) 1000 MG tablet Take 1 Tab by mouth 2 times a day, with meals. 180 Tab 1   • glimepiride (AMARYL) 4 MG Tab Take 1 Tab by mouth 2 times a day. 60 Tab 11   • hydroCHLOROthiazide (HYDRODIURIL) 25 MG Tab Take 1 Tab by mouth every day. 90 Tab 0   • atorvastatin (LIPITOR) 20 MG Tab Take 1 Tab by mouth every day. 90 Tab 3   • losartan (COZAAR) 100 MG Tab Take 1 Tab by mouth every day. 30 Tab 11   • Blood Glucose Monitoring Suppl Supplies Misc Test strips order: Test strips for Contour Next  meter. Sig: use bid and prn ssx high or low sugar #100 RF x 3 100 Each 11   • Blood Glucose Monitoring Suppl Supplies Misc Test strips order: Test strips for contour next meter. Sig: use bid 100 Each 11   • Lancets Misc Lancets order: Lancets for one touch meter. Sig: use bid and prn ssx high or low sugar. #100 RF x 3 100 Each 11   • Blood Glucose Monitoring Suppl Device Meter: Dispense one touch meter. Sig. Use as directed for blood sugar monitoring. #1. NR. 1 Device 0   • albuterol (PROVENTIL) 2.5mg/3ml Nebu Soln solution for nebulization 3 mL by Nebulization route every 6 hours as needed for Shortness of Breath. 75 mL 0   • albuterol (VENTOLIN OR PROVENTIL) 108 (90 BASE) MCG/ACT Aero Soln inhalation aerosol Inhale 2 Puffs by mouth every 6 hours as needed for Shortness of Breath. 8.5 g 3   • NON SPECIFIED Nebulizer 1 Each 0p     No current facility-administered medications for this visit.        Social History   Substance Use Topics   • Smoking status: Former Smoker     Packs/day: 0.50     Years: 19.00     Types: Cigarettes     Quit date: 1/15/1992   • Smokeless tobacco: Never Used       Comment: started smoking at age 23   • Alcohol use No       Family Status   Relation Status   • Mo  at age 49   • Fa  at age 85   • Sis  at age 66   • Bro Alive, age 80y        11 years older then PT   • Sis Alive, age 63y        6 years younger then PT   • MGMo    • MGFa    • PGMo    • PGFa    • Eugene Alive   • Eugene Alive     Family History   Problem Relation Age of Onset   • Diabetes Mother    • Heart Disease Mother    • Heart Attack Mother         multiple   • Heart Disease Father    • Alzheimer's Disease Father    • Stroke Sister    • Heart Disease Sister    • Lung Disease Sister    • Hypertension Sister    • Diabetes Brother         Bilat little toe amputation   • Hypertension Brother    • Heart Disease Brother         vascular   • No Known Problems Sister    • Heart Attack Maternal Grandmother    • Heart Attack Maternal Grandfather    • No Known Problems Paternal Grandmother    • No Known Problems Paternal Grandfather    • Hypertension Daughter    • Psychiatry Daughter    • No Known Problems Daughter        ROS:  Review of Systems   Constitutional: Negative for fever, chills, weight loss and malaise/fatigue.   HENT: Negative for ear pain, nosebleeds, congestion, sore throat and neck pain.    Eyes: Negative for blurred vision.   Respiratory: Negative for cough, sputum production, shortness of breath and wheezing.    Cardiovascular: Negative for chest pain, palpitations, orthopnea and leg swelling.   Gastrointestinal: Negative for heartburn, nausea, vomiting and abdominal pain.   Genitourinary: Negative for dysuria, urgency and frequency.   Musculoskeletal: Negative for myalgias, back pain.  Positive for bilateral knee pain.  Skin: Negative for rash and itching.   Neurological: Negative for dizziness, tingling, tremors, sensory change, focal weakness and headaches.   Endo/Heme/Allergies: Does not bruise/bleed easily.   Psychiatric/Behavioral: Negative for  "depression, suicidal ideas and memory loss.  The patient is not nervous/anxious and does not have insomnia.    All other systems reviewed and are negative except as in HPI.    Exam: Blood pressure (!) 186/78, pulse 88, temperature 36.5 °C (97.7 °F), resp. rate 18, height 1.588 m (5' 2.5\"), weight 97.3 kg (214 lb 6.4 oz), SpO2 90 %. Body mass index is 38.59 kg/m².  General: Normal appearing. No distress.  HEENT: Normocephalic. Eyes conjunctiva clear lids without ptosis, pupils equal and reactive to light accommodation, ears normal shape and contour, canals are clear bilaterally, tympanic membranes are benign, nasal mucosa benign, oropharynx is without erythema, edema or exudates.   Neck: Supple without JVD or bruit. Thyroid is not enlarged.  Pulmonary: Clear to ausculation.  Normal effort. No rales, ronchi, or wheezing.  Cardiovascular: Regular rate and rhythm without murmur.   Abdomen: Soft, nontender, nondistended. Normal bowel sounds.   Neurologic: Grossly nonfocal.  Cranial nerves are normal. LE DTR's normal and symmetric.  Lymph: No cervical, supraclavicular or axillary lymph nodes are palpable  Skin: Warm and dry.  No rashes or suspicious skin lesions.  Musculoskeletal: Normal gait. No extremity cyanosis, clubbing, or edema.  Bilateral medial aspects of the knees are tender to palpation.  Negative for swelling/erythema or warmth.  Normal range of motion of lower extremities.  Negative varus or valgus stress test.  Psych: Normal mood and affect. Alert and oriented x3. Judgment and insight is normal.   Diabetic Foot Exam: No ulcers or skin lesions present, patient tested with a 10 g force and is sensitive bilaterally throughout the ball of the foot, great toe and heel.      Medical decision-making and discussion:  1. Essential hypertension  Patient's blood pressure during today's appointment 186/78 mmHg.  Patient is currently taking losartan 100 mg tablet once daily advised patient to continue doing so.  Patient " has been prescribed hydrochlorothiazide 25 mg tab during today's appointment advised to take 1 tablet by mouth every morning.  Discussed common side effects and adverse reactions of medication with patient.  Labs as indicated. Discussed decreasing salt intake. Emphasized benefits of exercise and diet. Continue to monitor.  Patient will follow up in 1 month for reevaluation.    - COMP METABOLIC PANEL; Future  - hydroCHLOROthiazide (HYDRODIURIL) 25 MG Tab; Take 1 Tab by mouth every day.  Dispense: 90 Tab; Refill: 0    2. Morbid obesity with BMI of 40.0-44.9, adult ([facility])  A lipid profile and CMP have been ordered to further evaluate patient for cardiovascular conditions, electrolyte abnormalities/liver and kidney function/diabetes. Patient will follow-up in one month to review labwork results.    - Encouraged diet high in fruits, vegetables, and fiber. And a diet low in salt, refined carbohydrates, cholesterol, saturated fat, and trans fatty acids.    - Encouraged  a minimum of 30 minutes of moderate intensity aerobic exercise (eg, swimming, elliptical, stationary bike)  is recommended on five days each week.       - COMP METABOLIC PANEL; Future  - LIPID PROFILE; Future    3. Type 2 diabetes mellitus with diabetic peripheral angiopathy without gangrene, without long-term current use of insulin ([facility])  POCT Hemoglobin A1C: 10.4    Diabetes currently uncontrolled.  During today's appointment discontinue metformin 500 mg tab twice daily and change dose to 1000 mg twice daily.  Refilled glimepiride 4 mg twice daily.  Advised patient to continue taking medications as prescribed.  Patient also taking statin and ARB as instructed.  Advised patient to take 81 mg aspirin once daily. labwork as indicated. Diabetic foot exam and eye exams up to date.    Patient will follow up in 1 month with diabetic nurse Lynne Moreno and myself for further evaluation.    - POCT Hemoglobin A1C  - metformin (GLUCOPHAGE) 1000 MG tablet; Take 1  Tab by mouth 2 times a day, with meals.  Dispense: 180 Tab; Refill: 1  - glimepiride (AMARYL) 4 MG Tab; Take 1 Tab by mouth 2 times a day.  Dispense: 60 Tab; Refill: 11  - Diabetic Monofilament Lower Extremity Exam  - COMP METABOLIC PANEL; Future  - LIPID PROFILE; Future  - MICROALBUMIN CREAT RATIO URINE (LAB COLLECT); Future  - REFERRAL TO DIABETIC EDUCATION Diabetes Self Management Education / Training (DSME/T) and Medical Nutrition Therapy (MNT): Initial Group DSME/MNT as authorized by payor; DSME/T Content: Diabetes as disease process, Physical Activity, Psychological...    4. Severe nonproliferative diabetic retinopathy of both eyes associated with type 2 diabetes mellitus, macular edema presence unspecified (HCC)  Continue following up with ophthalmologist as indicated.  Will continue to monitor.  Emphasized the importance of being compliant with diabetic medications with patient.    5. Microalbuminuria  Microalbumin creatinine ratio urine has been ordered for patient complete.  Patient will be contacted with results.  Emphasized the importance of being compliant with diabetic medications.    - MICROALBUMIN CREAT RATIO URINE (LAB COLLECT); Future    6. Dyslipidemia  Well controlled. Labs as indicated. Continue statin medication. Continue to monitor.  Emphasized the benefits of a healthy diet and regular exercise routine.    - LIPID PROFILE; Future    7. Chronic pain of both knees  Patient is inquiring about OxyContin as a treatment option.  States 3 years ago she was prescribed OxyContin for bilateral knee pain symptoms while she lived in [place].  Discussed with patient that I will not be prescribing OxyContin during today's appointment.  X-rays have been ordered for patient complete and will discuss during follow-up appointment.  Advised patient to use over-the-counter Tylenol, stretch, low impact exercises, use heating pad as needed.  Also suggested ice for swelling and knee braces as needed.  Emphasized  importance of healthy diet and regular exercise routine to help normalize body weight.  Patient stated that she felt that today's appointment was a waste of her time if she is not going to get pain medication for chronic knee pain.  Will reassess patient during follow-up appointment and discuss treatment options during that time.     - DX-KNEE COMPLETE 4+ RIGHT; Future  - DX-KNEE COMPLETE 4+ LEFT; Future    8. Need for vaccination  Shingrix prescription was provided to patient during today's appointment.     - Zoster Vac Recomb Adjuvanted (SHINGRIX) 50 MCG Recon Susp; 0.5 mL by Intramuscular route Once for 1 dose.  Dispense: 0.5 mL; Refill: 1    9. Encounter for screening mammogram for breast cancer  Discussed importance of being screened for breast cancer with patient.  Mammogram has been ordered during today's appointment.  - MA-SCREEN MAMMO W/CAD-BILAT; Future    10. Screening for colon cancer  Discussed importance of being screened for colon cancer with patient.  Patient has been referred to gastroenterology for further evaluation.  - REFERRAL TO GI FOR COLONOSCOPY        Please note that this dictation was created using voice recognition software. I have made every reasonable attempt to correct obvious errors, but I expect that there are errors of grammar and possibly content that I did not discover before finalizing the note.      Return in about 1 month (around 9/23/2018).

## 2018-09-07 ENCOUNTER — TELEPHONE (OUTPATIENT)
Dept: MEDICAL GROUP | Facility: PHYSICIAN GROUP | Age: 68
End: 2018-09-07

## 2018-09-07 ENCOUNTER — HOSPITAL ENCOUNTER (OUTPATIENT)
Dept: RADIOLOGY | Facility: MEDICAL CENTER | Age: 68
End: 2018-09-07
Attending: PHYSICIAN ASSISTANT
Payer: MEDICARE

## 2018-09-07 DIAGNOSIS — M25.562 CHRONIC PAIN OF BOTH KNEES: ICD-10-CM

## 2018-09-07 DIAGNOSIS — G89.29 CHRONIC PAIN OF BOTH KNEES: ICD-10-CM

## 2018-09-07 DIAGNOSIS — M25.561 CHRONIC PAIN OF BOTH KNEES: ICD-10-CM

## 2018-09-07 DIAGNOSIS — M17.0 PRIMARY OSTEOARTHRITIS OF BOTH KNEES: ICD-10-CM

## 2018-09-07 PROCEDURE — 73562 X-RAY EXAM OF KNEE 3: CPT | Mod: LT

## 2018-09-07 NOTE — TELEPHONE ENCOUNTER
----- Message from Mili Graham P.A.-C. sent at 9/7/2018  1:37 PM PDT -----  Please call patient. I have reviewed patient's bilateral x-ray results.  Positive for degenerative joint disease.    Thank you,    Mariah ROBLEDO

## 2018-09-07 NOTE — TELEPHONE ENCOUNTER
Phone Number Called: [phone] (home)       Message: Pt informed by phone. Pt would like to know what there is to do about it?    Left Message for patient to call back: N\A

## 2018-09-30 DIAGNOSIS — E78.5 DYSLIPIDEMIA: ICD-10-CM

## 2018-10-01 RX ORDER — ATORVASTATIN CALCIUM 20 MG/1
20 TABLET, FILM COATED ORAL DAILY
Qty: 89 TAB | Refills: 0 | Status: SHIPPED | OUTPATIENT
Start: 2018-10-01 | End: 2019-01-16 | Stop reason: SDUPTHER

## 2018-11-01 ENCOUNTER — APPOINTMENT (OUTPATIENT)
Dept: RADIOLOGY | Facility: IMAGING CENTER | Age: 68
End: 2018-11-01
Attending: FAMILY MEDICINE
Payer: MEDICARE

## 2018-11-01 ENCOUNTER — OFFICE VISIT (OUTPATIENT)
Dept: URGENT CARE | Facility: CLINIC | Age: 68
End: 2018-11-01
Payer: MEDICARE

## 2018-11-01 VITALS
TEMPERATURE: 98.1 F | SYSTOLIC BLOOD PRESSURE: 130 MMHG | BODY MASS INDEX: 39.56 KG/M2 | OXYGEN SATURATION: 90 % | WEIGHT: 215 LBS | DIASTOLIC BLOOD PRESSURE: 80 MMHG | HEART RATE: 88 BPM | RESPIRATION RATE: 20 BRPM | HEIGHT: 62 IN

## 2018-11-01 DIAGNOSIS — J20.9 ACUTE BRONCHITIS, UNSPECIFIED ORGANISM: ICD-10-CM

## 2018-11-01 DIAGNOSIS — R05.9 COUGH: ICD-10-CM

## 2018-11-01 DIAGNOSIS — E11.51 TYPE 2 DIABETES MELLITUS WITH DIABETIC PERIPHERAL ANGIOPATHY WITHOUT GANGRENE, WITHOUT LONG-TERM CURRENT USE OF INSULIN (HCC): ICD-10-CM

## 2018-11-01 DIAGNOSIS — R06.02 SOB (SHORTNESS OF BREATH): ICD-10-CM

## 2018-11-01 DIAGNOSIS — I10 ESSENTIAL HYPERTENSION: ICD-10-CM

## 2018-11-01 DIAGNOSIS — J45.20 MILD INTERMITTENT REACTIVE AIRWAY DISEASE WITHOUT COMPLICATION: ICD-10-CM

## 2018-11-01 PROCEDURE — 94760 N-INVAS EAR/PLS OXIMETRY 1: CPT | Performed by: FAMILY MEDICINE

## 2018-11-01 PROCEDURE — 99203 OFFICE O/P NEW LOW 30 MIN: CPT | Mod: 25 | Performed by: FAMILY MEDICINE

## 2018-11-01 PROCEDURE — 71046 X-RAY EXAM CHEST 2 VIEWS: CPT | Mod: 26 | Performed by: FAMILY MEDICINE

## 2018-11-01 PROCEDURE — 94640 AIRWAY INHALATION TREATMENT: CPT | Performed by: FAMILY MEDICINE

## 2018-11-01 RX ORDER — IPRATROPIUM BROMIDE AND ALBUTEROL SULFATE 2.5; .5 MG/3ML; MG/3ML
3 SOLUTION RESPIRATORY (INHALATION) ONCE
Status: COMPLETED | OUTPATIENT
Start: 2018-11-01 | End: 2018-11-01

## 2018-11-01 RX ORDER — DOXYCYCLINE HYCLATE 100 MG
100 TABLET ORAL 2 TIMES DAILY
Qty: 20 TAB | Refills: 0 | Status: SHIPPED | OUTPATIENT
Start: 2018-11-01 | End: 2018-11-11

## 2018-11-01 RX ORDER — IPRATROPIUM BROMIDE AND ALBUTEROL SULFATE 2.5; .5 MG/3ML; MG/3ML
3 SOLUTION RESPIRATORY (INHALATION) EVERY 6 HOURS PRN
Qty: 30 BULLET | Refills: 0 | Status: SHIPPED | OUTPATIENT
Start: 2018-11-01

## 2018-11-01 RX ORDER — BENZONATATE 100 MG/1
100-200 CAPSULE ORAL 2 TIMES DAILY PRN
Qty: 30 CAP | Refills: 0 | Status: SHIPPED | OUTPATIENT
Start: 2018-11-01

## 2018-11-01 RX ADMIN — IPRATROPIUM BROMIDE AND ALBUTEROL SULFATE 3 ML: 2.5; .5 SOLUTION RESPIRATORY (INHALATION) at 16:35

## 2018-11-01 ASSESSMENT — ENCOUNTER SYMPTOMS
DIAPHORESIS: 0
SPUTUM PRODUCTION: 1
CHILLS: 0
FEVER: 0
PSYCHIATRIC NEGATIVE: 1
WEAKNESS: 0
GASTROINTESTINAL NEGATIVE: 1
PALPITATIONS: 0
MUSCULOSKELETAL NEGATIVE: 1
WHEEZING: 1
HEMOPTYSIS: 0
EYES NEGATIVE: 1
NEUROLOGICAL NEGATIVE: 1
COUGH: 1
SHORTNESS OF BREATH: 1
WEIGHT LOSS: 0

## 2018-11-01 NOTE — PROGRESS NOTES
Subjective:      Jessica Cueto is a 68 y.o. female who presents with Cough (u0gsvac, cough, sob, chest congestion)    Patient presents to urgent care with acute onset of a new problem.  she has had a cough and chest congestion some shortness of breath the cough is been productive for the past 3 weeks.  She has a history of reactive airway disease.  She has not required more use of her rescue inhaler. She is a diabetic states that bs have been running over 200 lately.  Denies any feeling lightheaded or faint.  No chest pain.      HPI  Review of Systems   Constitutional: Positive for malaise/fatigue. Negative for chills, diaphoresis, fever and weight loss.   HENT: Positive for congestion.    Eyes: Negative.    Respiratory: Positive for cough, sputum production, shortness of breath and wheezing. Negative for hemoptysis.    Cardiovascular: Negative for palpitations.   Gastrointestinal: Negative.    Genitourinary: Negative.    Musculoskeletal: Negative.    Skin: Negative for itching and rash.   Neurological: Negative.  Negative for weakness.   Endo/Heme/Allergies: Negative.    Psychiatric/Behavioral: Negative.      PMH:  has a past medical history of Asthma; Cancer ([facility]) (1995); Diabetes ([facility]); and Disease of small blood vessels (2012).  MEDS:   Current Outpatient Prescriptions:   •  losartan (COZAAR) 100 MG Tab, TAKE 1 TAB BY MOUTH EVERY DAY., Disp: 30 Tab, Rfl: 0  •  metformin (GLUCOPHAGE) 1000 MG tablet, Take 1 Tab by mouth 2 times a day, with meals., Disp: 180 Tab, Rfl: 1  •  Blood Glucose Monitoring Suppl Supplies Misc, Test strips order: Test strips for Contour Next  meter. Sig: use bid and prn ssx high or low sugar #100 RF x 3, Disp: 100 Each, Rfl: 11  •  Blood Glucose Monitoring Suppl Supplies Misc, Test strips order: Test strips for contour next meter. Sig: use bid, Disp: 100 Each, Rfl: 11  •  Lancets Misc, Lancets order: Lancets for one touch meter. Sig: use bid and prn ssx high or low sugar. #100 RF x 3,  "Disp: 100 Each, Rfl: 11  •  Blood Glucose Monitoring Suppl Device, Meter: Dispense one touch meter. Sig. Use as directed for blood sugar monitoring. #1. NR., Disp: 1 Device, Rfl: 0  •  albuterol (PROVENTIL) 2.5mg/3ml Nebu Soln solution for nebulization, 3 mL by Nebulization route every 6 hours as needed for Shortness of Breath., Disp: 75 mL, Rfl: 0  •  albuterol (VENTOLIN OR PROVENTIL) 108 (90 BASE) MCG/ACT Aero Soln inhalation aerosol, Inhale 2 Puffs by mouth every 6 hours as needed for Shortness of Breath., Disp: 8.5 g, Rfl: 3  •  atorvastatin (LIPITOR) 20 MG Tab, TAKE 1 TAB BY MOUTH EVERY DAY., Disp: 89 Tab, Rfl: 0  •  glimepiride (AMARYL) 4 MG Tab, Take 1 Tab by mouth 2 times a day., Disp: 60 Tab, Rfl: 11  •  hydroCHLOROthiazide (HYDRODIURIL) 25 MG Tab, Take 1 Tab by mouth every day. (Patient not taking: Reported on 11/1/2018), Disp: 90 Tab, Rfl: 0  •  NON SPECIFIED, Nebulizer, Disp: 1 Each, Rfl: 0p  ALLERGIES:   Allergies   Allergen Reactions   • Pcn [Penicillins] Anaphylaxis   • Rocephin [Ceftriaxone Sodium-Lidocaine] Anaphylaxis   SURGHX:   Past Surgical History:   Procedure Laterality Date   • ABDOMINAL HYSTERECTOMY TOTAL      Uterine CA   • APPENDECTOMY     • CHOLECYSTECTOMY     • DEBRIDEMENT     • ROTATOR CUFF REPAIR Right 2008 or 2009   • STENT PLACEMENT      RLE    • TONSILLECTOMY AND ADENOIDECTOMY     SOCHX:  reports that she quit smoking about 26 years ago. Her smoking use included Cigarettes. She has a 9.50 pack-year smoking history. She has never used smokeless tobacco. She reports that she uses drugs, including Marijuana, about 7 times per week. She reports that she does not drink alcohol.  FH: Family history was reviewed, no pertinent findings to report     Objective:     /80 (BP Location: Left arm, Patient Position: Sitting, BP Cuff Size: Adult)   Pulse 88   Temp 36.7 °C (98.1 °F) (Temporal)   Resp 20   Ht 1.575 m (5' 2\")   Wt 97.5 kg (215 lb)   SpO2 90%   BMI 39.32 kg/m²  "     Physical Exam   Constitutional: She is oriented to person, place, and time. She appears well-developed and well-nourished. No distress.   HENT:   Mouth/Throat: Oropharynx is clear and moist.   Eyes: Conjunctivae are normal.   Neck: Normal range of motion.   Cardiovascular: Normal rate, regular rhythm, normal heart sounds and intact distal pulses.  Exam reveals no gallop and no friction rub.    No murmur heard.  Pulmonary/Chest: No respiratory distress. She has wheezes. She has no rales. She exhibits no tenderness.   Musculoskeletal: Normal range of motion. She exhibits no edema.   Neurological: She is alert and oriented to person, place, and time.   Skin: Skin is warm and dry. She is not diaphoretic. No erythema.   Psychiatric: She has a normal mood and affect. Her behavior is normal. Judgment and thought content normal.     Diagnostics: xray per my review no acute abnormalities  Therapeutics: Patient had a DuoNeb treatment after which she had better air excursion felt better pulse ox improved to 95% on room air pulse states stable at 72    Assessment/Plan:     1. Type 2 diabetes mellitus with diabetic peripheral angiopathy without gangrene, without long-term current use of insulin ([facility])     2. Essential hypertension     3. Cough  Small Volume Nebulizer    [place] NONINVASV OXYGEN SATUR;SINGLE    Duoneb 0.5-2.5 (3) mg/3ml Inh Soln    DX-CHEST-2 VIEWS   4. SOB (shortness of breath)  Small Volume Nebulizer    [place] NONINVASV OXYGEN SATUR;SINGLE    Duoneb 0.5-2.5 (3) mg/3ml Inh Soln    DX-CHEST-2 VIEWS   5. Mild intermittent reactive airway disease without complication  ipratropium-albuterol (DUONEB) 0.5-2.5 (3) MG/3ML nebulizer solution   6. Acute bronchitis, unspecified organism  doxycycline (VIBRAMYCIN) 100 MG Tab    benzonatate (TESSALON) 100 MG Cap    ipratropium-albuterol (DUONEB) 0.5-2.5 (3) MG/3ML nebulizer solution     Differential diagnosis, natural history, supportive care discussed. Follow-up with primary care  provider within 7-10 days, emergency room precautions discussed.  Patient and/or family appears understanding of information.    Chronic conditions of diabetes and hypertension that may potentially impact the patient's ability to recover from this condition appear fair. The patient will f/u with their pcp and continue with current chronic medications as directed.

## 2018-11-02 ENCOUNTER — TELEPHONE (OUTPATIENT)
Dept: URGENT CARE | Facility: CLINIC | Age: 68
End: 2018-11-02

## 2018-11-02 NOTE — TELEPHONE ENCOUNTER
1. Caller Name: Jessica Cueto                                         Call Back Number: [phone] (home)         Patient approves a detailed voicemail message: N\A    Patient was seen yesterday and states a prescription for an inhaler was going to be sent to her pharmacy, please advise

## 2018-11-05 DIAGNOSIS — R06.2 WHEEZE: ICD-10-CM

## 2018-11-05 RX ORDER — ALBUTEROL SULFATE 90 UG/1
1-2 AEROSOL, METERED RESPIRATORY (INHALATION) EVERY 6 HOURS PRN
Qty: 1 INHALER | Refills: 0 | Status: SHIPPED | OUTPATIENT
Start: 2018-11-05 | End: 2018-11-06

## 2018-11-28 ENCOUNTER — PATIENT OUTREACH (OUTPATIENT)
Dept: HEALTH INFORMATION MANAGEMENT | Facility: OTHER | Age: 68
End: 2018-11-28

## 2018-11-28 NOTE — PROGRESS NOTES
Outcome: Currently in [place] will call us when she gets back    Please transfer to Patient Outreach Team at [phone] when patient returns call.    Attempt # 2

## 2018-12-21 ENCOUNTER — TELEPHONE (OUTPATIENT)
Dept: MEDICAL GROUP | Facility: PHYSICIAN GROUP | Age: 68
End: 2018-12-21

## 2018-12-21 DIAGNOSIS — Z12.11 SCREENING FOR COLON CANCER: ICD-10-CM

## 2018-12-21 NOTE — PROGRESS NOTES
Attempt #: Final    WebIZ Checked & Epic Updated: yes  [facility] Verified: yes  Verify PCP: yes    Communication Preference Obtained: yes     Review Care Team: no    Annual Wellness Visit Scheduling  1. Scheduling Status: Not scheduled / Not interested.    Care Gap Scheduling (Attempt to Schedule EACH Overdue Care Gap!) / Pt declined care gaps.   Health Maintenance Due   Topic Date Due   • IMM HEP B VACCINE (1 of 3 - Risk 3-dose series) 08/14/1967   • IMM ZOSTER VACCINES (1 of 2) 08/14/1998   • COLONOSCOPY  05/03/2016 / Declined Colonoscopy /  Mailed Fit Test.   • MAMMOGRAM  06/06/2018   • IMM INFLUENZA (1) 09/01/2018  / pt stated that she is sick.   • RETINAL SCREENING  09/21/2018 / Requested records from Natacha Perkins M.D.   • URINE ACR / MICROALBUMIN  09/25/2018   • Annual Wellness Visit  10/03/2018   • FASTING LIPID PROFILE  10/18/2018   • SERUM CREATININE  10/18/2018       [employer] Activation: sent activation code

## 2019-01-26 DIAGNOSIS — I10 ESSENTIAL HYPERTENSION: ICD-10-CM

## 2019-01-29 RX ORDER — HYDROCHLOROTHIAZIDE 25 MG/1
TABLET ORAL
Qty: 60 TAB | Refills: 0 | Status: SHIPPED | OUTPATIENT
Start: 2019-01-29 | End: 2019-04-05 | Stop reason: SDUPTHER

## 2019-02-20 ENCOUNTER — PATIENT OUTREACH (OUTPATIENT)
Dept: HEALTH INFORMATION MANAGEMENT | Facility: OTHER | Age: 69
End: 2019-02-20

## 2019-02-20 NOTE — PROGRESS NOTES
Declined Care Management - Patient stated she is having eye surgery and will call back when she is ready to reschedule her mammogram appointment that she missed in 2018.

## 2019-02-24 DIAGNOSIS — E11.51 TYPE 2 DIABETES MELLITUS WITH DIABETIC PERIPHERAL ANGIOPATHY WITHOUT GANGRENE, WITHOUT LONG-TERM CURRENT USE OF INSULIN (HCC): ICD-10-CM

## 2019-03-26 ENCOUNTER — ANESTHESIA (OUTPATIENT)
Dept: SURGERY | Facility: MEDICAL CENTER | Age: 69
End: 2019-03-26
Payer: MEDICARE

## 2019-03-26 ENCOUNTER — ANESTHESIA EVENT (OUTPATIENT)
Dept: SURGERY | Facility: MEDICAL CENTER | Age: 69
End: 2019-03-26
Payer: MEDICARE

## 2019-03-26 ENCOUNTER — HOSPITAL ENCOUNTER (OUTPATIENT)
Facility: MEDICAL CENTER | Age: 69
End: 2019-03-26
Attending: OPHTHALMOLOGY | Admitting: OPHTHALMOLOGY
Payer: MEDICARE

## 2019-03-26 VITALS
OXYGEN SATURATION: 92 % | RESPIRATION RATE: 14 BRPM | BODY MASS INDEX: 38.35 KG/M2 | TEMPERATURE: 97.5 F | WEIGHT: 209.66 LBS | HEART RATE: 66 BPM

## 2019-03-26 PROBLEM — E11.43 GASTROPARESIS DUE TO DM (HCC): Chronic | Status: ACTIVE | Noted: 2019-03-26

## 2019-03-26 PROBLEM — K31.84 GASTROPARESIS DUE TO DM (HCC): Chronic | Status: ACTIVE | Noted: 2019-03-26

## 2019-03-26 LAB
ANION GAP SERPL CALC-SCNC: 10 MMOL/L (ref 0–11.9)
BUN SERPL-MCNC: 26 MG/DL (ref 8–22)
CALCIUM SERPL-MCNC: 8.8 MG/DL (ref 8.5–10.5)
CHLORIDE SERPL-SCNC: 108 MMOL/L (ref 96–112)
CO2 SERPL-SCNC: 20 MMOL/L (ref 20–33)
CREAT SERPL-MCNC: 1.1 MG/DL (ref 0.5–1.4)
EKG IMPRESSION: NORMAL
GLUCOSE SERPL-MCNC: 282 MG/DL (ref 65–99)
POTASSIUM SERPL-SCNC: 4.1 MMOL/L (ref 3.6–5.5)
SODIUM SERPL-SCNC: 138 MMOL/L (ref 135–145)

## 2019-03-26 PROCEDURE — 501836 HCHG SUTURE EYE: Performed by: OPHTHALMOLOGY

## 2019-03-26 PROCEDURE — 160041 HCHG SURGERY MINUTES - EA ADDL 1 MIN LEVEL 4: Performed by: OPHTHALMOLOGY

## 2019-03-26 PROCEDURE — 700111 HCHG RX REV CODE 636 W/ 250 OVERRIDE (IP): Performed by: ANESTHESIOLOGY

## 2019-03-26 PROCEDURE — 700101 HCHG RX REV CODE 250

## 2019-03-26 PROCEDURE — 160048 HCHG OR STATISTICAL LEVEL 1-5: Performed by: OPHTHALMOLOGY

## 2019-03-26 PROCEDURE — 700111 HCHG RX REV CODE 636 W/ 250 OVERRIDE (IP): Mod: JG

## 2019-03-26 PROCEDURE — 700111 HCHG RX REV CODE 636 W/ 250 OVERRIDE (IP)

## 2019-03-26 PROCEDURE — 160029 HCHG SURGERY MINUTES - 1ST 30 MINS LEVEL 4: Performed by: OPHTHALMOLOGY

## 2019-03-26 PROCEDURE — 160002 HCHG RECOVERY MINUTES (STAT): Performed by: OPHTHALMOLOGY

## 2019-03-26 PROCEDURE — 160047 HCHG PACU  - EA ADDL 30 MINS PHASE II: Performed by: OPHTHALMOLOGY

## 2019-03-26 PROCEDURE — 160035 HCHG PACU - 1ST 60 MINS PHASE I: Performed by: OPHTHALMOLOGY

## 2019-03-26 PROCEDURE — 80048 BASIC METABOLIC PNL TOTAL CA: CPT

## 2019-03-26 PROCEDURE — 36415 COLL VENOUS BLD VENIPUNCTURE: CPT

## 2019-03-26 PROCEDURE — 160046 HCHG PACU - 1ST 60 MINS PHASE II: Performed by: OPHTHALMOLOGY

## 2019-03-26 PROCEDURE — 160025 RECOVERY II MINUTES (STATS): Performed by: OPHTHALMOLOGY

## 2019-03-26 PROCEDURE — A6410 STERILE EYE PAD: HCPCS | Performed by: OPHTHALMOLOGY

## 2019-03-26 PROCEDURE — 93005 ELECTROCARDIOGRAM TRACING: CPT | Performed by: OPHTHALMOLOGY

## 2019-03-26 PROCEDURE — 93010 ELECTROCARDIOGRAM REPORT: CPT | Performed by: INTERNAL MEDICINE

## 2019-03-26 PROCEDURE — 700101 HCHG RX REV CODE 250: Performed by: ANESTHESIOLOGY

## 2019-03-26 PROCEDURE — 160009 HCHG ANES TIME/MIN: Performed by: OPHTHALMOLOGY

## 2019-03-26 PROCEDURE — 500558 HCHG EYE SHIELD W/GARTER (FOX): Performed by: OPHTHALMOLOGY

## 2019-03-26 PROCEDURE — 160036 HCHG PACU - EA ADDL 30 MINS PHASE I: Performed by: OPHTHALMOLOGY

## 2019-03-26 RX ORDER — HYDRALAZINE HYDROCHLORIDE 20 MG/ML
5 INJECTION INTRAMUSCULAR; INTRAVENOUS
Status: DISCONTINUED | OUTPATIENT
Start: 2019-03-26 | End: 2019-03-26 | Stop reason: HOSPADM

## 2019-03-26 RX ORDER — SODIUM CHLORIDE, SODIUM LACTATE, POTASSIUM CHLORIDE, CALCIUM CHLORIDE 600; 310; 30; 20 MG/100ML; MG/100ML; MG/100ML; MG/100ML
INJECTION, SOLUTION INTRAVENOUS CONTINUOUS
Status: DISCONTINUED | OUTPATIENT
Start: 2019-03-26 | End: 2019-03-26 | Stop reason: HOSPADM

## 2019-03-26 RX ORDER — BALANCED SALT SOLUTION ENRICHED WITH BICARBONATE, DEXTROSE, AND GLUTATHIONE
KIT INTRAOCULAR
Status: DISCONTINUED
Start: 2019-03-26 | End: 2019-03-26 | Stop reason: HOSPADM

## 2019-03-26 RX ORDER — TROPICAMIDE 10 MG/ML
SOLUTION/ DROPS OPHTHALMIC
Status: COMPLETED
Start: 2019-03-26 | End: 2019-03-26

## 2019-03-26 RX ORDER — TRIAMCINOLONE ACETONIDE 40 MG/ML
INJECTION, SUSPENSION INTRA-ARTICULAR; INTRAMUSCULAR
Status: DISCONTINUED | OUTPATIENT
Start: 2019-03-26 | End: 2019-03-26 | Stop reason: HOSPADM

## 2019-03-26 RX ORDER — DEXMEDETOMIDINE HYDROCHLORIDE 100 UG/ML
INJECTION, SOLUTION INTRAVENOUS PRN
Status: DISCONTINUED | OUTPATIENT
Start: 2019-03-26 | End: 2019-03-26 | Stop reason: SURG

## 2019-03-26 RX ORDER — METOPROLOL TARTRATE 1 MG/ML
1 INJECTION, SOLUTION INTRAVENOUS
Status: DISCONTINUED | OUTPATIENT
Start: 2019-03-26 | End: 2019-03-26 | Stop reason: HOSPADM

## 2019-03-26 RX ORDER — MOXIFLOXACIN 5 MG/ML
SOLUTION/ DROPS OPHTHALMIC
Status: COMPLETED
Start: 2019-03-26 | End: 2019-03-26

## 2019-03-26 RX ORDER — PHENYLEPHRINE HYDROCHLORIDE 10 MG/ML
INJECTION, SOLUTION INTRAMUSCULAR; INTRAVENOUS; SUBCUTANEOUS PRN
Status: DISCONTINUED | OUTPATIENT
Start: 2019-03-26 | End: 2019-03-26 | Stop reason: SURG

## 2019-03-26 RX ORDER — BALANCED SALT SOLUTION 6.4; .75; .48; .3; 3.9; 1.7 MG/ML; MG/ML; MG/ML; MG/ML; MG/ML; MG/ML
SOLUTION OPHTHALMIC
Status: DISCONTINUED | OUTPATIENT
Start: 2019-03-26 | End: 2019-03-26 | Stop reason: HOSPADM

## 2019-03-26 RX ORDER — HALOPERIDOL 5 MG/ML
1 INJECTION INTRAMUSCULAR
Status: DISCONTINUED | OUTPATIENT
Start: 2019-03-26 | End: 2019-03-26 | Stop reason: HOSPADM

## 2019-03-26 RX ORDER — OXYCODONE HCL 5 MG/5 ML
5 SOLUTION, ORAL ORAL
Status: DISCONTINUED | OUTPATIENT
Start: 2019-03-26 | End: 2019-03-26 | Stop reason: HOSPADM

## 2019-03-26 RX ORDER — MAGNESIUM SULFATE HEPTAHYDRATE 500 MG/ML
INJECTION, SOLUTION INTRAMUSCULAR; INTRAVENOUS
Status: DISCONTINUED
Start: 2019-03-26 | End: 2019-03-26 | Stop reason: HOSPADM

## 2019-03-26 RX ORDER — DEXMEDETOMIDINE HYDROCHLORIDE 100 UG/ML
INJECTION, SOLUTION INTRAVENOUS
Status: COMPLETED
Start: 2019-03-26 | End: 2019-03-26

## 2019-03-26 RX ORDER — VANCOMYCIN HYDROCHLORIDE 500 MG/10ML
INJECTION, POWDER, LYOPHILIZED, FOR SOLUTION INTRAVENOUS
Status: COMPLETED | OUTPATIENT
Start: 2019-03-26 | End: 2019-03-26

## 2019-03-26 RX ORDER — BALANCED SALT SOLUTION ENRICHED WITH BICARBONATE, DEXTROSE, AND GLUTATHIONE
KIT INTRAOCULAR
Status: DISCONTINUED | OUTPATIENT
Start: 2019-03-26 | End: 2019-03-26 | Stop reason: HOSPADM

## 2019-03-26 RX ORDER — OXYCODONE HCL 5 MG/5 ML
10 SOLUTION, ORAL ORAL
Status: DISCONTINUED | OUTPATIENT
Start: 2019-03-26 | End: 2019-03-26 | Stop reason: HOSPADM

## 2019-03-26 RX ORDER — NEOMYCIN SULFATE, POLYMYXIN B SULFATE, AND DEXAMETHASONE 3.5; 10000; 1 MG/G; [USP'U]/G; MG/G
OINTMENT OPHTHALMIC
Status: DISCONTINUED | OUTPATIENT
Start: 2019-03-26 | End: 2019-03-26 | Stop reason: HOSPADM

## 2019-03-26 RX ORDER — FLURBIPROFEN SODIUM 0.3 MG/ML
SOLUTION/ DROPS OPHTHALMIC
Status: COMPLETED
Start: 2019-03-26 | End: 2019-03-26

## 2019-03-26 RX ORDER — PHENYLEPHRINE HYDROCHLORIDE 25 MG/ML
SOLUTION/ DROPS OPHTHALMIC
Status: COMPLETED
Start: 2019-03-26 | End: 2019-03-26

## 2019-03-26 RX ORDER — MAGNESIUM SULFATE HEPTAHYDRATE 40 MG/ML
INJECTION, SOLUTION INTRAVENOUS PRN
Status: DISCONTINUED | OUTPATIENT
Start: 2019-03-26 | End: 2019-03-26 | Stop reason: SURG

## 2019-03-26 RX ORDER — ONDANSETRON 2 MG/ML
INJECTION INTRAMUSCULAR; INTRAVENOUS PRN
Status: DISCONTINUED | OUTPATIENT
Start: 2019-03-26 | End: 2019-03-26 | Stop reason: SURG

## 2019-03-26 RX ORDER — DEXAMETHASONE SODIUM PHOSPHATE 4 MG/ML
INJECTION, SOLUTION INTRA-ARTICULAR; INTRALESIONAL; INTRAMUSCULAR; INTRAVENOUS; SOFT TISSUE
Status: DISCONTINUED | OUTPATIENT
Start: 2019-03-26 | End: 2019-03-26 | Stop reason: HOSPADM

## 2019-03-26 RX ORDER — LIDOCAINE HYDROCHLORIDE 40 MG/ML
SOLUTION TOPICAL
Status: DISCONTINUED
Start: 2019-03-26 | End: 2019-03-26 | Stop reason: HOSPADM

## 2019-03-26 RX ORDER — ONDANSETRON 2 MG/ML
4 INJECTION INTRAMUSCULAR; INTRAVENOUS
Status: COMPLETED | OUTPATIENT
Start: 2019-03-26 | End: 2019-03-26

## 2019-03-26 RX ORDER — CYCLOPENTOLATE HYDROCHLORIDE 10 MG/ML
SOLUTION/ DROPS OPHTHALMIC
Status: COMPLETED
Start: 2019-03-26 | End: 2019-03-26

## 2019-03-26 RX ORDER — ONDANSETRON 2 MG/ML
4 INJECTION INTRAMUSCULAR; INTRAVENOUS
Status: DISCONTINUED | OUTPATIENT
Start: 2019-03-26 | End: 2019-03-26 | Stop reason: HOSPADM

## 2019-03-26 RX ADMIN — MAGNESIUM SULFATE IN WATER 1 G: 40 INJECTION, SOLUTION INTRAVENOUS at 09:05

## 2019-03-26 RX ADMIN — CYCLOPENTOLATE HYDROCHLORIDE 1 DROP: 10 SOLUTION/ DROPS OPHTHALMIC at 08:00

## 2019-03-26 RX ADMIN — SUCCINYLCHOLINE CHLORIDE 140 MG: 20 INJECTION, SOLUTION INTRAMUSCULAR; INTRAVENOUS at 08:59

## 2019-03-26 RX ADMIN — FLURBIPROFEN SODIUM 1 DROP: 0.3 SOLUTION/ DROPS OPHTHALMIC at 08:00

## 2019-03-26 RX ADMIN — SODIUM CHLORIDE, SODIUM LACTATE, POTASSIUM CHLORIDE, CALCIUM CHLORIDE: 600; 310; 30; 20 INJECTION, SOLUTION INTRAVENOUS at 09:50

## 2019-03-26 RX ADMIN — PHENYLEPHRINE HYDROCHLORIDE 100 MCG: 10 INJECTION INTRAVENOUS at 09:20

## 2019-03-26 RX ADMIN — ONDANSETRON 4 MG: 2 INJECTION INTRAMUSCULAR; INTRAVENOUS at 10:11

## 2019-03-26 RX ADMIN — SODIUM CHLORIDE, SODIUM LACTATE, POTASSIUM CHLORIDE, CALCIUM CHLORIDE: 600; 310; 30; 20 INJECTION, SOLUTION INTRAVENOUS at 07:37

## 2019-03-26 RX ADMIN — DEXMEDETOMIDINE HYDROCHLORIDE 20 MCG: 100 INJECTION, SOLUTION INTRAVENOUS at 09:07

## 2019-03-26 RX ADMIN — ROCURONIUM BROMIDE 15 MG: 10 INJECTION, SOLUTION INTRAVENOUS at 08:59

## 2019-03-26 RX ADMIN — SODIUM CHLORIDE, SODIUM LACTATE, POTASSIUM CHLORIDE, CALCIUM CHLORIDE: 600; 310; 30; 20 INJECTION, SOLUTION INTRAVENOUS at 08:54

## 2019-03-26 RX ADMIN — FENTANYL CITRATE 50 MCG: 50 INJECTION, SOLUTION INTRAMUSCULAR; INTRAVENOUS at 09:15

## 2019-03-26 RX ADMIN — DEXMEDETOMIDINE HYDROCHLORIDE 20 MCG: 100 INJECTION, SOLUTION INTRAVENOUS at 09:10

## 2019-03-26 RX ADMIN — TROPICAMIDE 1 DROP: 10 SOLUTION/ DROPS OPHTHALMIC at 08:00

## 2019-03-26 RX ADMIN — PROPOFOL 20 MG: 10 INJECTION, EMULSION INTRAVENOUS at 09:30

## 2019-03-26 RX ADMIN — PHENYLEPHRINE HYDROCHLORIDE 1 DROP: 2.5 SOLUTION/ DROPS OPHTHALMIC at 08:00

## 2019-03-26 RX ADMIN — MAGNESIUM SULFATE IN WATER 1 G: 40 INJECTION, SOLUTION INTRAVENOUS at 09:10

## 2019-03-26 RX ADMIN — MIDAZOLAM HYDROCHLORIDE 2 MG: 1 INJECTION, SOLUTION INTRAMUSCULAR; INTRAVENOUS at 08:59

## 2019-03-26 RX ADMIN — PROPOFOL 150 MG: 10 INJECTION, EMULSION INTRAVENOUS at 08:59

## 2019-03-26 RX ADMIN — PROPOFOL 30 MG: 10 INJECTION, EMULSION INTRAVENOUS at 09:40

## 2019-03-26 RX ADMIN — FENTANYL CITRATE 50 MCG: 50 INJECTION, SOLUTION INTRAMUSCULAR; INTRAVENOUS at 08:59

## 2019-03-26 RX ADMIN — MOXIFLOXACIN HYDROCHLORIDE 1 DROP: 5 SOLUTION/ DROPS OPHTHALMIC at 08:00

## 2019-03-26 RX ADMIN — ONDANSETRON 4 MG: 2 INJECTION INTRAMUSCULAR; INTRAVENOUS at 08:59

## 2019-03-26 ASSESSMENT — PAIN SCALES - GENERAL: PAIN_LEVEL: 0

## 2019-03-26 NOTE — ANESTHESIA PREPROCEDURE EVALUATION
uan ble to take bp meds last night because of N&V from gastroparesis      Relevant Problems   (+) Essential hypertension   (+) Type 2 diabetes mellitus with diabetic peripheral angiopathy without gangrene, without long-term current use of insulin (HCC)       Physical Exam    Airway   Mallampati: II  TM distance: >3 FB  Neck ROM: full       Cardiovascular   Rhythm: regular     Dental    Pulmonary   Breath sounds clear to auscultation     Abdominal    Neurological              Anesthesia Plan    ASA 2       Plan - general             Induction: intravenous          Informed Consent:    Anesthetic plan and risks discussed with patient.

## 2019-03-26 NOTE — ANESTHESIA PROCEDURE NOTES
Airway  Date/Time: 3/26/2019 9:03 AM  Performed by: EDU PINTO  Authorized by: EDU PINTO     Location:  OR  Urgency:  Elective  Indications for Airway Management:  Anesthesia  Spontaneous Ventilation: absent    Sedation Level:  Deep  Preoxygenated: Yes    Patient Position:  Sniffing  Mask Difficulty Assessment:  1 - vent by mask  Final Airway Type:  Endotracheal airway  Final Endotracheal Airway:  ETT and EFRAÍN tube  Cuffed: Yes    Technique Used for Successful ETT Placement:  Direct laryngoscopy  Insertion Site:  Oral  Blade Type:  Dominique  Laryngoscope Blade/Videolaryngoscope Blade Size:  3  ETT Size (mm):  7.0  Measured from:  Teeth  ETT to Teeth (cm):  22  Placement Verified by: auscultation and capnometry    Cormack-Lehane Classification:  Grade IIa - partial view of glottis  Number of Attempts at Approach:  1

## 2019-03-26 NOTE — ANESTHESIA POSTPROCEDURE EVALUATION
Patient: Jessica Cueto    Procedure Summary     Date:  03/26/19 Room / Location:  [facility] ROOM 24 / SURGERY SAME DAY [facility]    Anesthesia Start:  0854 Anesthesia Stop:      Procedure:  VITRECTOMY POSTERIOR - MEMBRANE PEEL, LASER, GAS (Right Eye) Diagnosis:       Macular pucker      (MACULAR PUCKER)    Surgeon:  Natacha Perkins M.D. Responsible Provider:  Don Toro M.D.    Anesthesia Type:  general ASA Status:  2          Final Anesthesia Type: general  Last vitals  BP   NIBP: (!) 207/91    Temp   36.8 °C (98.3 °F)    Pulse   Pulse: 70   Resp   20    SpO2   95 %      Anesthesia Post Evaluation    Patient location during evaluation: PACU  Patient participation: complete - patient participated  Level of consciousness: awake and alert  Pain score: 0    Airway patency: patent  Anesthetic complications: no  Cardiovascular status: hemodynamically stable  Respiratory status: acceptable  Hydration status: euvolemic    PONV: none

## 2019-03-26 NOTE — ANESTHESIA TIME REPORT
Anesthesia Start and Stop Event Times     Date Time Event    3/26/2019 0854 Anesthesia Start     1005 Anesthesia Stop        Responsible Staff  03/26/19    Name Role Begin End    Don Toro M.D. Anesth 0854 1005        Preop Diagnosis (Free Text):  Pre-op Diagnosis     vitrectomacular traction , MACULAR PUCKER        Preop Diagnosis (Codes):  Diagnosis Information     Diagnosis Code(s): Macular pucker [H35.379]        Post op Diagnosis  Diabetic retinopathy      Premium Reason  Non-Premium    Comments:

## 2019-03-26 NOTE — DISCHARGE INSTRUCTIONS
ACTIVITY: Rest and take it easy for the first 24 hours.  A responsible adult is recommended to remain with you during that time.  It is normal to feel sleepy.  We encourage you to not do anything that requires balance, judgment or coordination.    MILD FLU-LIKE SYMPTOMS ARE NORMAL. YOU MAY EXPERIENCE GENERALIZED MUSCLE ACHES, THROAT IRRITATION, HEADACHE AND/OR SOME NAUSEA.    FOR 24 HOURS DO NOT:  Drive, operate machinery or run household appliances.  Drink beer or alcoholic beverages.   Make important decisions or sign legal documents.    SPECIAL INSTRUCTIONS: Keep head/face down when sitting/standing. Sleep on right or left side. Avoid heavy lifting/heavy exercises until cleared by doctor.     DIET: To avoid nausea, slowly advance diet as tolerated, avoiding spicy or greasy foods for the first day.  Add more substantial food to your diet according to your physician's instructions.  Babies can be fed formula or breast milk as soon as they are hungry.  INCREASE FLUIDS AND FIBER TO AVOID CONSTIPATION.    SURGICAL DRESSING/BATHING: Keep eye shield in place, keep it clean and dry.     FOLLOW-UP APPOINTMENT:  A follow-up appointment should be arranged with your doctor ; call to schedule.    You should CALL YOUR PHYSICIAN if you develop:  Fever greater than 101 degrees F.  Pain not relieved by medication, or persistent nausea or vomiting.  Excessive bleeding (blood soaking through dressing) or unexpected drainage from the wound.  Extreme redness or swelling around the incision site, drainage of pus or foul smelling drainage.  Inability to urinate or empty your bladder within 8 hours.  Problems with breathing or chest pain.    You should call 911 if you develop problems with breathing or chest pain.  If you are unable to contact your doctor or surgical center, you should go to the nearest emergency room or urgent care center.  Physician's telephone #: Dr Perkins [phone].    If any questions arise, call your  doctor.  If your doctor is not available, please feel free to call the Surgical Center at [phone].  The Center is open Monday through Friday from 7AM to 7PM.  You can also call the HEALTH HOTLINE open 24 hours/day, 7 days/week and speak to a nurse at [phone], or toll free at [phone].    A registered nurse may call you a few days after your surgery to see how you are doing after your procedure.    MEDICATIONS: Resume taking daily medication.  Take prescribed pain medication with food.  If no medication is prescribed, you may take non-aspirin pain medication if needed.  PAIN MEDICATION CAN BE VERY CONSTIPATING.  Take a stool softener or laxative such as senokot, pericolace, or milk of magnesia if needed.     Last pain medication given at __________.    If your physician has prescribed pain medication that includes Acetaminophen (Tylenol), do not take additional Acetaminophen (Tylenol) while taking the prescribed medication.    Depression / Suicide Risk    As you are discharged from this [facility] facility, it is important to learn how to keep safe from harming yourself.    Recognize the warning signs:  · Abrupt changes in personality, positive or negative- including increase in energy   · Giving away possessions  · Change in eating patterns- significant weight changes-  positive or negative  · Change in sleeping patterns- unable to sleep or sleeping all the time   · Unwillingness or inability to communicate  · Depression  · Unusual sadness, discouragement and loneliness  · Talk of wanting to die  · Neglect of personal appearance   · Rebelliousness- reckless behavior  · Withdrawal from people/activities they love  · Confusion- inability to concentrate     If you or a loved one observes any of these behaviors or has concerns about self-harm, here's what you can do:  · Talk about it- your feelings and reasons for harming yourself  · Remove any means that you might use to hurt yourself (examples:  pills, rope, extension cords, firearm)  · Get professional help from the community (Mental Health, Substance Abuse, psychological counseling)  · Do not be alone:Call your Safe Contact- someone whom you trust who will be there for you.  · Call your local CRISIS HOTLINE 817-2625 or 649-264-6833  · Call your local Children's Mobile Crisis Response Team [place] (000) 739-1892 or www.[employer]  · Call the toll free National Suicide Prevention Hotlines   · National Suicide Prevention Lifeline 509-684-FLWV (2002)  · National Hope Line Network 800-SUICIDE (064-4011)

## 2019-03-26 NOTE — OP REPORT
DATE OF SERVICE:  03/26/2019    PREOPERATIVE DIAGNOSIS:  Epiretinal membrane with macular thickening, right   eye.    POSTOPERATIVE DIAGNOSIS:  Epiretinal membrane with macular thickening, right   eye.    PROCEDURE:   A 23-gauge pars plana vitrectomy with epiretinal membrane and   internal limiting membrane peel, Endolaser, 20% SF6, right eye.    SURGEON:  Natacha Perkins MD    ASSISTANT:  Vidal Mckeon MD    ANESTHESIA:  General endotracheal.    ANESTHESIOLOGIST:  Don Toro MD.    FLUIDS:  See anesthesia note.    COMPLICATIONS:  None.    INDICATIONS:  The patient with DME as well as macular pucker.  Risks,   benefits, and alternatives of surgery were discussed with the patient.    Patient consented for the procedure.    DETAILS OF THE PROCEDURE:  The correct eye was marked in the preop area.  The   patient was taken to the operating room.  General anesthesia was induced by   anesthesia.  Patient was prepped and draped in the usual sterile ophthalmic   fashion.  Site was marked 3 mm from the limbus in the inferotemporal quadrant.    A 23-gauge trocar was used in a beveled fashion to enter the vitreous   cavity.  Infusion was placed in the eye, visualized with the light pipe and   then turned on.  One site superonasally and another site superotemporally were   then marked 3 mm from the limbus.  A 23-gauge trocar was used in a beveled   fashion to enter the vitreous cavity.  Light pipe and vitrector were inserted   inside the eye.  We performed good core and peripheral vitrectomy to remove   the vitreous.  We used 23-gauge internal limiting membrane forceps to peel the   epiretinal membrane and internal limiting membrane without any complications.    Scleral depression was then performed.  We noticed an old retinal hole.  We   performed Endolaser around that.  Air fluid exchange was then performed, 20%   SF6 was placed in the eye.  Trocars were then removed.  There was no leak.    Postop Ancef and  dexamethasone were injected subconjunctivally.  The drapes   were then removed.  Patient's face was cleaned, ointment applied to the eye.    Eye was patched and shielded.  Patient was taken to the recovery room in good   condition.  There were no complications.       ____________________________________     MD BEV ROYAL / DEMETRA    DD:  03/26/2019 10:25:50  DT:  03/26/2019 11:05:52    D#:  [id number]  Job#:  [id number]

## 2019-03-26 NOTE — OR NURSING
0952 Patient arrived from OR on [facility]. Report received from anesthesia and RN. Oral airway in place. Eye shield on R eye.  Will continue to monitor.   1005 Pt woke up, airway out, pt repositioned to  R side.   1011 Pt nauseated, zofran IV given.   1030 Niece at bedside, pt still very drowsy, repositioned to L side.   1045 Pt c/o nausea, queaseease in use  1156 Pt verbalized readiness to go home, d/c instructions reviewed with pt and niece, copy given.   1221 D/c criteria met, PIV out, d/cd via wheelchair, belongings with patient.

## 2019-03-26 NOTE — ANESTHESIA QCDR
2019 [facility] Clinical Data Registry (for Quality Improvement)     Postoperative nausea/vomiting risk protocol (Adult = 18 yrs and Pediatric 3-17 yrs)- (430 and 463)  General inhalation anesthetic (NOT TIVA) with PONV risk factors: Yes  Provision of anti-emetic therapy with at least 2 different classes of agents: Yes   Patient DID NOT receive anti-emetic therapy and reason is documented in Medical Record:  N/A    Multimodal Pain Management- (AQI59)  Patient undergoing Elective Surgery (i.e. Outpatient, or ASC, or Prescheduled Surgery prior to Hospital Admission): No  Use of Multimodal Pain Management, two or more drugs and/or interventions, NOT including systemic opioids: N/A  Exception: Documented allergy to multiple classes of analgesics: N/A    PACU assessment of acute postoperative pain prior to Anesthesia Care End- Applies to Patients Age = 18- (ABG7)  Initial PACU pain score is which of the following: < 7/10  Patient unable to report pain score: N/A    Post-anesthetic transfer of care checklist/protocol to PACU/ICU- (426 and 427)  Upon conclusion of case, patient transferred to which of the following locations: PACU/Non-ICU  Use of transfer checklist/protocol: Yes  Exclusion: Service Performed in Patient Hospital Room (and thus did not require transfer): N/A    PACU Reintubation- (AQI31)  General anesthesia requiring endotracheal intubation (ETT) along with subsequent extubation in OR or PACU: No  Required reintubation in the PACU: N/A  Extubation was a planned trial documented in the medical record prior to removal of the original airway device: N/A    Unplanned admission to ICU related to anesthesia service up through end of PACU care- (MD51)  Unplanned admission to ICU (not initially anticipated at anesthesia start time): No

## 2019-04-05 DIAGNOSIS — I10 ESSENTIAL HYPERTENSION: ICD-10-CM

## 2019-04-05 RX ORDER — HYDROCHLOROTHIAZIDE 25 MG/1
25 TABLET ORAL
Qty: 30 TAB | Refills: 0 | Status: SHIPPED | OUTPATIENT
Start: 2019-04-05 | End: 2019-05-07 | Stop reason: SDUPTHER

## 2019-04-24 DIAGNOSIS — E11.51 TYPE 2 DIABETES MELLITUS WITH DIABETIC PERIPHERAL ANGIOPATHY WITHOUT GANGRENE, WITHOUT LONG-TERM CURRENT USE OF INSULIN (HCC): ICD-10-CM

## 2019-07-25 DIAGNOSIS — E78.5 DYSLIPIDEMIA: ICD-10-CM

## 2019-07-25 RX ORDER — ATORVASTATIN CALCIUM 20 MG/1
20 TABLET, FILM COATED ORAL
Qty: 30 TAB | Refills: 0 | Status: SHIPPED | OUTPATIENT
Start: 2019-07-25 | End: 2019-08-30

## 2019-08-05 DIAGNOSIS — E11.51 TYPE 2 DIABETES MELLITUS WITH DIABETIC PERIPHERAL ANGIOPATHY WITHOUT GANGRENE, WITHOUT LONG-TERM CURRENT USE OF INSULIN (HCC): ICD-10-CM

## 2019-08-30 ENCOUNTER — OFFICE VISIT (OUTPATIENT)
Dept: URGENT CARE | Facility: CLINIC | Age: 69
End: 2019-08-30
Payer: MEDICARE

## 2019-08-30 VITALS
TEMPERATURE: 97.8 F | OXYGEN SATURATION: 94 % | HEART RATE: 84 BPM | DIASTOLIC BLOOD PRESSURE: 110 MMHG | RESPIRATION RATE: 16 BRPM | SYSTOLIC BLOOD PRESSURE: 210 MMHG | WEIGHT: 217 LBS | BODY MASS INDEX: 39.93 KG/M2 | HEIGHT: 62 IN

## 2019-08-30 DIAGNOSIS — L85.3 DRY SKIN DERMATITIS: ICD-10-CM

## 2019-08-30 DIAGNOSIS — K31.84 GASTROPARESIS DUE TO DM (HCC): Chronic | ICD-10-CM

## 2019-08-30 DIAGNOSIS — R03.0 ELEVATED BLOOD PRESSURE READING: ICD-10-CM

## 2019-08-30 DIAGNOSIS — L30.4 INTERTRIGO: ICD-10-CM

## 2019-08-30 DIAGNOSIS — E11.43 GASTROPARESIS DUE TO DM (HCC): Chronic | ICD-10-CM

## 2019-08-30 PROCEDURE — 99214 OFFICE O/P EST MOD 30 MIN: CPT | Performed by: PHYSICIAN ASSISTANT

## 2019-08-30 RX ORDER — HYDROXYZINE HYDROCHLORIDE 25 MG/1
25 TABLET, FILM COATED ORAL 3 TIMES DAILY PRN
Qty: 30 TAB | Refills: 0 | Status: SHIPPED | OUTPATIENT
Start: 2019-08-30 | End: 2019-09-20

## 2019-08-30 RX ORDER — NYSTATIN 100000 U/G
CREAM TOPICAL
Qty: 60 G | Refills: 0 | Status: SHIPPED | OUTPATIENT
Start: 2019-08-30

## 2019-08-30 RX ORDER — FLUCONAZOLE 100 MG/1
100 TABLET ORAL DAILY
Qty: 7 TAB | Refills: 0 | Status: SHIPPED | OUTPATIENT
Start: 2019-08-30 | End: 2019-09-06

## 2019-08-30 RX ORDER — METOCLOPRAMIDE 5 MG/1
5 TABLET ORAL 3 TIMES DAILY PRN
Qty: 30 TAB | Refills: 0 | Status: SHIPPED | OUTPATIENT
Start: 2019-08-30

## 2019-09-02 NOTE — PROGRESS NOTES
Chief Complaint   Patient presents with   • Itchy     x 3 days, itchy all over, vomiting last 3 days,        HISTORY OF PRESENT ILLNESS: Patient is a 69 y.o. female who presents today for    1.  Increase in gastroparesis symptoms x 3 days.  Hx of previously dx due to poorly controlled DM.  She states mostly dry heaving and has not taken her medication in 3 days.  She was noted to have high BP today and admits she has not been taking her BP meds.   She denies headache, chest pressure, dizziness or vision changes.       2.  Itchy all over as well as rash under skin that is more itchy and is red.   She notes the diffuse body itching keeps her awake at times with all the scratching it causes her to do.  No new meds, detergents that she can attribute to this itchiness.       Patient Active Problem List    Diagnosis Date Noted   • Gastroparesis due to DM ([facility]) 03/26/2019     Priority: Medium     Class: Chronic   • Chronic pain of both knees 08/23/2018   • Dyslipidemia 10/24/2017   • Morbid obesity with BMI of 40.0-44.9, adult ([facility]) 10/02/2017   • Severe nonproliferative diabetic retinopathy of both eyes associated with type 2 diabetes mellitus ([facility]) 10/02/2017   • Microalbuminuria 10/02/2017   • Severe episode of recurrent major depressive disorder, without psychotic features ([facility]) 10/02/2017   • History of ovarian cancer 05/06/2016   • Chronic low back pain without sciatica 05/06/2016   • Multiple thyroid nodules 09/04/2015   • Essential hypertension 09/04/2015   • Chronic wound of extremity 08/26/2015   • Type 2 diabetes mellitus with diabetic peripheral angiopathy without gangrene, without long-term current use of insulin ([facility]) 08/26/2015   • PVD (peripheral vascular disease) ([facility]) 08/26/2015       Allergies:Pcn [penicillins] and Rocephin [ceftriaxone sodium-lidocaine]    Current Outpatient Medications Ordered in Epic   Medication Sig Dispense Refill   • hydrOXYzine HCl (ATARAX) 25 MG Tab Take 1 Tab by mouth 3 times a  day as needed for Itching. 30 Tab 0   • metoclopramide (REGLAN) 5 MG tablet Take 1 Tab by mouth 3 times a day as needed. 30 Tab 0   • fluconazole (DIFLUCAN) 100 MG Tab Take 1 Tab by mouth every day for 7 days. 7 Tab 0   • nystatin (MYCOSTATIN) 722421 UNIT/GM Cream topical cream Apply to affected BID x 14 days. 60 g 0   • metformin (GLUCOPHAGE) 1000 MG tablet TAKE 1 TABLET BY MOUTH TWICE A DAY WITH MEALS 200 Tab 0   • hydroCHLOROthiazide (HYDRODIURIL) 25 MG Tab TAKE 1 TABLET BY MOUTH EVERY DAY 30 Tab 6   • losartan (COZAAR) 100 MG Tab TAKE 1 TAB BY MOUTH EVERY DAY. 30 Tab 0   • glimepiride (AMARYL) 4 MG Tab Take 1 Tab by mouth 2 times a day. 60 Tab 11   • Lancets Misc Lancets order: Lancets for one touch meter. Sig: use bid and prn ssx high or low sugar. #100 RF x 3 100 Each 11   • Blood Glucose Monitoring Suppl Device Meter: Dispense one touch meter. Sig. Use as directed for blood sugar monitoring. #1. NR. 1 Device 0   • albuterol (PROVENTIL) 2.5mg/3ml Nebu Soln solution for nebulization 3 mL by Nebulization route every 6 hours as needed for Shortness of Breath. 75 mL 0   • benzonatate (TESSALON) 100 MG Cap Take 1-2 Caps by mouth 2 times a day as needed for Cough. 30 Cap 0   • ipratropium-albuterol (DUONEB) 0.5-2.5 (3) MG/3ML nebulizer solution 3 mL by Nebulization route every 6 hours as needed for Shortness of Breath. 30 Bullet 0   • NON SPECIFIED Nebulizer 1 Each 0p   • Blood Glucose Monitoring Suppl Supplies Misc Test strips order: Test strips for Contour Next  meter. Sig: use bid and prn ssx high or low sugar #100 RF x 3 100 Each 11   • Blood Glucose Monitoring Suppl Supplies Misc Test strips order: Test strips for contour next meter. Sig: use bid 100 Each 11   • albuterol (VENTOLIN OR PROVENTIL) 108 (90 BASE) MCG/ACT Aero Soln inhalation aerosol Inhale 2 Puffs by mouth every 6 hours as needed for Shortness of Breath. 8.5 g 3     No current [facility]-ordered facility-administered medications on file.        Past  Medical History:   Diagnosis Date   • Arthritis     osteo   • Asthma    • Cancer (HCC)     uterine cancer   • Diabetes (HCC)    • Disease of small blood vessels (HCC)     Knees down   • Hepatitis A    • Urinary incontinence        Social History     Tobacco Use   • Smoking status: Former Smoker     Packs/day: 0.50     Years: 19.00     Pack years: 9.50     Types: Cigarettes     Last attempt to quit: 1/15/1992     Years since quittin.6   • Smokeless tobacco: Never Used   • Tobacco comment: started smoking at age 23   Substance Use Topics   • Alcohol use: No   • Drug use: Yes     Frequency: 7.0 times per week     Types: Marijuana     Comment: marijuana cream for pain in foot       Family Status   Relation Name Status   • Mo   at age 49   • Fa   at age 85   • Sis Rosie  at age 66   • Gabriel Kareem Alive, age 81y        11 years older then PT   • Jovanna Jay Alive, age 64y        6 years younger then PT   • MGMo     • MGFa     • PGMo     • PGFa     • Eugene  Alive   • Eugene  Alive     Family History   Problem Relation Age of Onset   • Diabetes Mother    • Heart Disease Mother    • Heart Attack Mother         multiple   • Heart Disease Father    • Alzheimer's Disease Father    • Stroke Sister    • Heart Disease Sister    • Lung Disease Sister    • Hypertension Sister    • Diabetes Brother         Bilat little toe amputation   • Hypertension Brother    • Heart Disease Brother         vascular   • No Known Problems Sister    • Heart Attack Maternal Grandmother    • Heart Attack Maternal Grandfather    • No Known Problems Paternal Grandmother    • No Known Problems Paternal Grandfather    • Hypertension Daughter    • Psychiatric Illness Daughter    • No Known Problems Daughter        ROS:  Review of Systems   Constitutional: Negative for fever, chills, weight loss and malaise/fatigue.   HENT: Negative for ear pain, nosebleeds, congestion, sore throat and neck  "pain.    Eyes: Negative for blurred vision.   Respiratory: Negative for cough, sputum production, shortness of breath and wheezing.    Cardiovascular: Negative for chest pain, palpitations, orthopnea and leg swelling.   Gastrointestinal: SEE HPI  Genitourinary: Negative for dysuria, urgency and frequency.   Skin: SEE HPI    Exam:  BP (!) 210/110 (BP Location: Left arm, Patient Position: Sitting, BP Cuff Size: Adult)   Pulse 84   Temp 36.6 °C (97.8 °F) (Temporal)   Resp 16   Ht 1.575 m (5' 2\")   Wt 98.4 kg (217 lb)   SpO2 94%   General:  Well nourished, well developed female in NAD  Eyes: PERRLA, EOM within normal limits, no conjunctival injection, no scleral icterus, visual fields and acuity grossly intact.  Ears: Normal shape and symmetry, no tenderness, no discharge. External canals are without any significant edema or erythema. Tympanic membranes are without any inflammation, no effusion. Gross auditory acuity is intact  Nose: Symmetrical, sinuses without tenderness, no discharge.   Mouth: reasonable hygiene, no erythema exudates or tonsillar enlargement. Moist mucus membranes.   Neck: no masses, range of motion within normal limits, no tracheal deviation. No lymphadenopathy  Pulmonary: Normal respiratory effort, no wheezes, crackles, or rhonchi.  Cardiovascular: regular rate and rhythm without murmurs, rubs, or gallops.  Abdomen: Soft, nontender, nondistended. Normal bowel sounds. No hepatosplenomegaly or masses, or hernias. No rebound or guarding.  Patient with some dry heaving in clinic.   Skin:  Warm, pink, dry.  Intertriginous area of lower abdomen, left to midline with papular and macular erythema.      Remainder of skin with dry, flaky areas however no discrete lesions.   Extremities: no clubbing, cyanosis, or edema.  Neuro: A&O x 3. Speech normal/clear.  Normal gait.       Assessment/Plan:  1. Intertrigo  fluconazole (DIFLUCAN) 100 MG Tab    nystatin (MYCOSTATIN) 999146 UNIT/GM Cream topical cream "   2. Dry skin dermatitis  hydrOXYzine HCl (ATARAX) 25 MG Tab   3. Gastroparesis due to DM (HCC)  metoclopramide (REGLAN) 5 MG tablet         -patient aware she needs follow up with PCP as it has been some time.  Given in trial of reglan to improve gastroparesis symptoms so she is able to take her medication once again emphasizing the importance of this.   -recommend hydration of skin with emollient moisturizers daily, given atarax for itching  -for local intertrigo oral and topical rx provided  -discussed if she is not able to improve her symptoms in the next 24-48 hours and cannot take her meds she needs to present to ED for stabilization and she expresses understanding.       Supportive care, differential diagnoses, and indications for immediate follow-up discussed with patient.   Pathogenesis of diagnosis discussed including typical length and natural progression.   Instructed to return to clinic or nearest emergency department for any change in condition, further concerns, or worsening of symptoms.  Patient states understanding of the plan of care and discharge instructions.  Instructed to make an appointment, for follow up, with their primary care provider.      Eliana Rice P.A.-C.

## 2019-09-17 NOTE — PROGRESS NOTES
1. Attempt #:1    2. HealthConnect Verified: yes    3. Verify PCP: yes    4. Review Care Team: yes    5. WebIZ Checked & Epic Updated: No WebIZ record  · WebIZ Recommendations: FLU, HEPATITIS B and SHINGRIX (Shingles)  · Is patient due for Tdap? NO  · Is patient due for Shingles? YES. Patient was not notified of copay/out of pocket cost.    6. Reviewed/Updated the following with patient:       •   Communication Preference Obtained? YES       •   Preferred Pharmacy? YES       •   Preferred Lab? YES       •   Family History (document living status of immediate family members and if + hx of cancer, diabetes, hypertension, hyperlipidemia, heart attack, stroke) YES    7. Annual Wellness Visit Scheduling  · Scheduling Status:Scheduled     8. Care Gap Scheduling (Attempt to Schedule EACH Overdue Care Gap!)     Health Maintenance Due   Topic Date Due   • HEPATITIS C SCREENING  1950   • IMM HEP B VACCINE (1 of 3 - Risk 3-dose series) 08/14/1967   • IMM ZOSTER VACCINES (1 of 2) 08/14/1998   • COLONOSCOPY  05/03/2016   • MAMMOGRAM  06/06/2018   • RETINAL SCREENING  09/21/2018   • URINE ACR / MICROALBUMIN  09/25/2018   • Annual Wellness Visit  10/03/2018   • FASTING LIPID PROFILE  10/18/2018   • A1C SCREENING  02/23/2019   • DIABETES MONOFILAMENT / LE EXAM  08/23/2019   • IMM INFLUENZA (1) 09/01/2019        Scheduled patient for Annual Wellness Visit     9. Loylap Activation: already active    10. Loylap Lara: no    11. Virtual Visits: no    12. Opt In to Text Messages: no    13. Patient was advised: “This is a free wellness visit. The provider will screen for medical conditions to help you stay healthy. If you have other concerns to address you may be asked to discuss these at a separate visit or there may be an additional fee.”     14. Patient was informed to arrive 15 min prior to their scheduled appointment and bring in their medication bottles.

## 2019-09-20 ENCOUNTER — OFFICE VISIT (OUTPATIENT)
Dept: MEDICAL GROUP | Facility: PHYSICIAN GROUP | Age: 69
End: 2019-09-20
Payer: MEDICARE

## 2019-09-20 VITALS
DIASTOLIC BLOOD PRESSURE: 70 MMHG | HEIGHT: 62 IN | WEIGHT: 214.6 LBS | OXYGEN SATURATION: 93 % | TEMPERATURE: 98.4 F | RESPIRATION RATE: 18 BRPM | HEART RATE: 77 BPM | SYSTOLIC BLOOD PRESSURE: 132 MMHG | BODY MASS INDEX: 39.49 KG/M2

## 2019-09-20 DIAGNOSIS — E78.5 DYSLIPIDEMIA: ICD-10-CM

## 2019-09-20 DIAGNOSIS — I73.9 PVD (PERIPHERAL VASCULAR DISEASE) (HCC): ICD-10-CM

## 2019-09-20 DIAGNOSIS — I10 ESSENTIAL HYPERTENSION: ICD-10-CM

## 2019-09-20 DIAGNOSIS — Z85.42 HISTORY OF UTERINE CANCER: ICD-10-CM

## 2019-09-20 DIAGNOSIS — E66.9 OBESITY (BMI 35.0-39.9 WITHOUT COMORBIDITY): ICD-10-CM

## 2019-09-20 DIAGNOSIS — K31.84 GASTROPARESIS DUE TO DM (HCC): Chronic | ICD-10-CM

## 2019-09-20 DIAGNOSIS — R80.9 MICROALBUMINURIA: ICD-10-CM

## 2019-09-20 DIAGNOSIS — E11.43 GASTROPARESIS DUE TO DM (HCC): Chronic | ICD-10-CM

## 2019-09-20 DIAGNOSIS — M25.561 CHRONIC PAIN OF BOTH KNEES: ICD-10-CM

## 2019-09-20 DIAGNOSIS — Z23 NEED FOR VACCINATION: ICD-10-CM

## 2019-09-20 DIAGNOSIS — Z12.31 ENCOUNTER FOR SCREENING MAMMOGRAM FOR BREAST CANCER: ICD-10-CM

## 2019-09-20 DIAGNOSIS — E04.2 MULTIPLE THYROID NODULES: ICD-10-CM

## 2019-09-20 DIAGNOSIS — M54.50 CHRONIC BILATERAL LOW BACK PAIN WITHOUT SCIATICA: ICD-10-CM

## 2019-09-20 DIAGNOSIS — Z12.11 SCREENING FOR COLON CANCER: ICD-10-CM

## 2019-09-20 DIAGNOSIS — G89.29 CHRONIC PAIN OF BOTH KNEES: ICD-10-CM

## 2019-09-20 DIAGNOSIS — Z00.00 MEDICARE ANNUAL WELLNESS VISIT, SUBSEQUENT: Primary | ICD-10-CM

## 2019-09-20 DIAGNOSIS — E11.51 TYPE 2 DIABETES MELLITUS WITH DIABETIC PERIPHERAL ANGIOPATHY WITHOUT GANGRENE, WITHOUT LONG-TERM CURRENT USE OF INSULIN (HCC): ICD-10-CM

## 2019-09-20 DIAGNOSIS — J45.20 MILD INTERMITTENT ASTHMA WITHOUT COMPLICATION: ICD-10-CM

## 2019-09-20 DIAGNOSIS — G89.29 CHRONIC BILATERAL LOW BACK PAIN WITHOUT SCIATICA: ICD-10-CM

## 2019-09-20 DIAGNOSIS — M25.562 CHRONIC PAIN OF BOTH KNEES: ICD-10-CM

## 2019-09-20 DIAGNOSIS — E11.3493 SEVERE NONPROLIFERATIVE DIABETIC RETINOPATHY OF BOTH EYES ASSOCIATED WITH TYPE 2 DIABETES MELLITUS, MACULAR EDEMA PRESENCE UNSPECIFIED (HCC): ICD-10-CM

## 2019-09-20 DIAGNOSIS — F33.2 SEVERE EPISODE OF RECURRENT MAJOR DEPRESSIVE DISORDER, WITHOUT PSYCHOTIC FEATURES (HCC): ICD-10-CM

## 2019-09-20 DIAGNOSIS — N18.30 STAGE 3 CHRONIC KIDNEY DISEASE (HCC): ICD-10-CM

## 2019-09-20 PROBLEM — J45.909 ASTHMA: Status: ACTIVE | Noted: 2019-09-20

## 2019-09-20 PROBLEM — E66.01 MORBID OBESITY WITH BMI OF 40.0-44.9, ADULT (HCC): Status: RESOLVED | Noted: 2017-10-02 | Resolved: 2019-09-20

## 2019-09-20 PROCEDURE — G0439 PPPS, SUBSEQ VISIT: HCPCS | Performed by: PHYSICIAN ASSISTANT

## 2019-09-20 PROCEDURE — G0008 ADMIN INFLUENZA VIRUS VAC: HCPCS | Performed by: PHYSICIAN ASSISTANT

## 2019-09-20 PROCEDURE — 90662 IIV NO PRSV INCREASED AG IM: CPT | Performed by: PHYSICIAN ASSISTANT

## 2019-09-20 RX ORDER — ATORVASTATIN CALCIUM 20 MG/1
TABLET, FILM COATED ORAL
Qty: 30 TAB | Refills: 0 | Status: SHIPPED | OUTPATIENT
Start: 2019-09-20 | End: 2019-12-16

## 2019-09-20 ASSESSMENT — PATIENT HEALTH QUESTIONNAIRE - PHQ9
5. POOR APPETITE OR OVEREATING: 3 - NEARLY EVERY DAY
CLINICAL INTERPRETATION OF PHQ2 SCORE: 6
SUM OF ALL RESPONSES TO PHQ QUESTIONS 1-9: 20

## 2019-09-20 ASSESSMENT — ACTIVITIES OF DAILY LIVING (ADL): BATHING_REQUIRES_ASSISTANCE: 0

## 2019-09-20 ASSESSMENT — ENCOUNTER SYMPTOMS: GENERAL WELL-BEING: FAIR

## 2019-09-20 NOTE — PROGRESS NOTES
Chief Complaint   Patient presents with   • Annual Wellness Visit     Annual Wellness   • Medication Refill     Tessalon, nebulizer machine          HPI:  Jessica Cueto is a 69 y.o. here for Medicare Annual Wellness Visit     Patient Active Problem List    Diagnosis Date Noted   • Gastroparesis due to DM ([facility]) 03/26/2019     Priority: Medium     Class: Chronic   • Obesity (BMI 35.0-39.9 without comorbidity) ([facility]) 09/20/2019   • Asthma 09/20/2019   • Chronic pain of both knees 08/23/2018   • Dyslipidemia 10/24/2017   • Severe nonproliferative diabetic retinopathy of both eyes associated with type 2 diabetes mellitus ([facility]) 10/02/2017   • Microalbuminuria 10/02/2017   • Severe episode of recurrent major depressive disorder, without psychotic features ([facility]) 10/02/2017   • History of uterine cancer 05/06/2016   • Chronic low back pain without sciatica 05/06/2016   • Multiple thyroid nodules 09/04/2015   • Essential hypertension 09/04/2015   • Chronic wound of extremity 08/26/2015   • Type 2 diabetes mellitus with diabetic peripheral angiopathy without gangrene, without long-term current use of insulin ([facility]) 08/26/2015   • PVD (peripheral vascular disease) ([facility]) 08/26/2015       Current Outpatient Medications   Medication Sig Dispense Refill   • atorvastatin (LIPITOR) 20 MG Tab TAKE 1 TABLET BY MOUTH EVERY DAY -MUST BE SEEN FOR REFILL 30 Tab 0   • metoclopramide (REGLAN) 5 MG tablet Take 1 Tab by mouth 3 times a day as needed. 30 Tab 0   • nystatin (MYCOSTATIN) 579367 UNIT/GM Cream topical cream Apply to affected BID x 14 days. 60 g 0   • metformin (GLUCOPHAGE) 1000 MG tablet TAKE 1 TABLET BY MOUTH TWICE A DAY WITH MEALS 200 Tab 0   • hydroCHLOROthiazide (HYDRODIURIL) 25 MG Tab TAKE 1 TABLET BY MOUTH EVERY DAY 30 Tab 6   • benzonatate (TESSALON) 100 MG Cap Take 1-2 Caps by mouth 2 times a day as needed for Cough. 30 Cap 0   • ipratropium-albuterol (DUONEB) 0.5-2.5 (3) MG/3ML nebulizer solution 3 mL by Nebulization route  every 6 hours as needed for Shortness of Breath. 30 Bullet 0   • losartan (COZAAR) 100 MG Tab TAKE 1 TAB BY MOUTH EVERY DAY. 30 Tab 0   • glimepiride (AMARYL) 4 MG Tab Take 1 Tab by mouth 2 times a day. 60 Tab 11   • NON SPECIFIED Nebulizer 1 Each 0p   • Blood Glucose Monitoring Suppl Supplies Misc Test strips order: Test strips for Contour Next  meter. Sig: use bid and prn ssx high or low sugar #100 RF x 3 100 Each 11   • Blood Glucose Monitoring Suppl Supplies Misc Test strips order: Test strips for contour next meter. Sig: use bid 100 Each 11   • Lancets Misc Lancets order: Lancets for one touch meter. Sig: use bid and prn ssx high or low sugar. #100 RF x 3 100 Each 11   • Blood Glucose Monitoring Suppl Device Meter: Dispense one touch meter. Sig. Use as directed for blood sugar monitoring. #1. NR. 1 Device 0   • albuterol (PROVENTIL) 2.5mg/3ml Nebu Soln solution for nebulization 3 mL by Nebulization route every 6 hours as needed for Shortness of Breath. 75 mL 0   • albuterol (VENTOLIN OR PROVENTIL) 108 (90 BASE) MCG/ACT Aero Soln inhalation aerosol Inhale 2 Puffs by mouth every 6 hours as needed for Shortness of Breath. 8.5 g 3     No current facility-administered medications for this visit.             Current supplements as per medication list.       Allergies: Pcn [penicillins] and Rocephin [ceftriaxone sodium-lidocaine]    Current social contact/activities:      She  reports that she quit smoking about 27 years ago. Her smoking use included cigarettes. She has a 9.50 pack-year smoking history. She has never used smokeless tobacco. She reports that she has current or past drug history. Drug: Marijuana. Frequency: 7.00 times per week. She reports that she does not drink alcohol.  Counseling given: Not Answered  Comment: started smoking at age 23      DPA/Advanced Directive:  Patient does not have an Advanced Directive.  A packet and workshop information was given on Advanced Directives.    ROS:    Gait: Uses a  walker and cane   Ostomy: No  Other tubes: No  Amputations: No  Chronic oxygen use: No  Last eye exam: 8/23/19  Wears hearing aids: No   : Reports urinary leakage during the last 6 months that has not interfered at all with their daily activities or sleep.    Screening:    Depression Screening    Little interest or pleasure in doing things?  3 - nearly every day  Feeling down, depressed , or hopeless? 3 - nearly every day  Trouble falling or staying asleep, or sleeping too much?  2 - more than half the days  Feeling tired or having little energy?  3 - nearly every day  Poor appetite or overeating?  3 - nearly every day  Feeling bad about yourself - or that you are a failure or have let yourself or your family down? 2 - more than half the days  Trouble concentrating on things, such as reading the newspaper or watching television? 2 - more than half the days  Moving or speaking so slowly that other people could have noticed.  Or the opposite - being so fidgety or restless that you have been moving around a lot more than usual?  0 - not at all  Thoughts that you would be better off dead, or of hurting yourself?  2 - more than half the days  Patient Health Questionnaire Score: 20    If depressive symptoms identified deferred to follow up visit unless specifically addressed in assessment and plan.    Interpretation of PHQ-9 Total Score   Score Severity   1-4 No Depression   5-9 Mild Depression   10-14 Moderate Depression   15-19 Moderately Severe Depression   20-27 Severe Depression      Screening for Cognitive Impairment    Three Minute Recall (village, kitchen, baby) 3/3    Rupesh clock face with all 12 numbers and set the hands to show 10 past 10.  Yes    Cognitive concerns identified deferred for follow up unless specifically addressed in assessment and plan.    Fall Risk Assessment    Has the patient had two or more falls in the last year or any fall with injury in the last year?  Yes    Safety Assessment    Throw  rugs on floor.  Yes  Handrails on all stairs.  Yes  Good lighting in all hallways.  No  Difficulty hearing.  Yes  Patient counseled about all safety risks that were identified.    Functional Assessment ADLs    Are there any barriers preventing you from cooking for yourself or meeting nutritional needs?  No.    Are there any barriers preventing you from driving safely or obtaining transportation?  No.    Are there any barriers preventing you from using a telephone or calling for help?  No.    Are there any barriers preventing you from shopping?  No.    Are there any barriers preventing you from taking care of your own finances?  No.    Are there any barriers preventing you from managing your medications?  Yes. Sometimes forgets   Are there any barriers preventing you from showering, bathing or dressing yourself? No.    Are you currently engaging in any exercise or physical activity?  No.     What is your perception of your health?  Fair.      Health Maintenance Summary                HEPATITIS C SCREENING Overdue 1950     IMM HEP B VACCINE Overdue 8/14/1967     IMM ZOSTER VACCINES Overdue 8/14/1998     COLONOSCOPY Overdue 5/3/2016      Done 5/3/2011     MAMMOGRAM Overdue 6/6/2018      Done 6/6/2016 MA-SCREEN MAMMO W/CAD-BILAT    RETINAL SCREENING Overdue 9/21/2018      Done 9/21/2017     URINE ACR / MICROALBUMIN Overdue 9/25/2018      Done 9/25/2017 MICROALBUMIN CREAT RATIO URINE     Patient has more history with this topic...    Annual Wellness Visit Overdue 10/3/2018      Done 10/2/2017     FASTING LIPID PROFILE Overdue 10/18/2018      Done 10/18/2017 LIPID PROFILE     Patient has more history with this topic...    A1C SCREENING Overdue 2/23/2019      Done 8/23/2018 POCT A1C     Patient has more history with this topic...    DIABETES MONOFILAMENT / LE EXAM Overdue 8/23/2019      Done 8/23/2018 AMB DIABETIC MONOFILAMENT LOWER EXTREMITY EXAM     Patient has more history with this topic...    SERUM CREATININE  Next Due 3/26/2020      Done 3/26/2019 BASIC METABOLIC PANEL     Patient has more history with this topic...    BONE DENSITY Next Due 2021      Done 2016 DS-BONE DENSITY STUDY (DEXA)          Patient Care Team:  Mili Graham P.A.-C. as PCP - General (Family Medicine)  Syd Leigh M.D. as Consulting Physician (Ophthalmology)  Natacha Perkins M.D. as Consulting Physician (Ophthalmology)      Social History     Tobacco Use   • Smoking status: Former Smoker     Packs/day: 0.50     Years: 19.00     Pack years: 9.50     Types: Cigarettes     Last attempt to quit: 1/15/1992     Years since quittin.7   • Smokeless tobacco: Never Used   • Tobacco comment: started smoking at age 23   Substance Use Topics   • Alcohol use: No   • Drug use: Yes     Frequency: 7.0 times per week     Types: Marijuana     Comment: marijuana cream for pain in foot     Family History   Problem Relation Age of Onset   • Diabetes Mother    • Heart Disease Mother    • Heart Attack Mother         multiple   • Heart Disease Father    • Alzheimer's Disease Father    • Stroke Sister    • Heart Disease Sister    • Lung Disease Sister    • Hypertension Sister    • Diabetes Brother         Bilat little toe amputation   • Hypertension Brother    • Heart Disease Brother         vascular   • No Known Problems Sister    • Heart Attack Maternal Grandmother    • Heart Attack Maternal Grandfather    • No Known Problems Paternal Grandmother    • No Known Problems Paternal Grandfather    • Hypertension Daughter    • Psychiatric Illness Daughter    • No Known Problems Daughter      She  has a past medical history of Arthritis, Asthma, Cancer (HCC) (), Diabetes (), Disease of small blood vessels (HCC) (), Hepatitis A (), and Urinary incontinence.   Past Surgical History:   Procedure Laterality Date   • VITRECTOMY POSTERIOR Right 3/26/2019    Procedure: VITRECTOMY POSTERIOR - MEMBRANE PEEL, LASER, GAS;  Surgeon: Natacha Perkins M.D.;   "Location: SURGERY SAME DAY [facility];  Service: Ophthalmology   • ACHILLES TENDON REPAIR Right 2015   • ABDOMINAL HYSTERECTOMY TOTAL      Uterine CA   • APPENDECTOMY     • CHOLECYSTECTOMY     • DEBRIDEMENT     • ROTATOR CUFF REPAIR Right 2008 or 2009   • STENT PLACEMENT      RLE    • TONSILLECTOMY AND ADENOIDECTOMY         Exam:   /70 (BP Location: Left arm, Patient Position: Sitting, BP Cuff Size: Adult)   Pulse 77   Temp 36.9 °C (98.4 °F) (Temporal)   Resp 18   Ht 1.575 m (5' 2\")   Wt 97.3 kg (214 lb 9.6 oz)   SpO2 93%  Body mass index is 39.25 kg/m².    Hearing fair.    Dentition fair  Alert, oriented in no acute distress.  Eye contact is good, speech goal directed, affect calm    Assessment and Plan. The following treatment and monitoring plan is recommended:      1. Medicare annual wellness visit, subsequent  HRA reviewed and appropriate. Reviewed medical history and current medications with patient. Reviewed immunizations with patient.  Ambulatory and Anticipatory Guidelines have been discussed with patient, see discussion below.    - Subsequent Annual Wellness Visit - Includes PPPS ()    2. Type 2 diabetes mellitus with diabetic peripheral angiopathy without gangrene, without long-term current use of insulin (HCC)  Uncontrolled.  I have not seen patient in 13 months.  She tells me that she has been living part-time in [place] and seeing her PCP there.  I do not have those records to review.  Lab work has been ordered to further evaluate patient.  Patient will follow-up in 1 month to review lab work results.  She tells me that he is taking her diabetic medications as prescribed.  Admits that her diet could improve.  Denies having regular exercise routine.  Denies polyuria, polydipsia, poor wound healing, dizziness, syncope.   Positive for vision changes.  Patient follows up with her optometrist closely.    - Comp Metabolic Panel; Future  - HEMOGLOBIN A1C; Future  - Lipid Profile; Future  - " MICROALBUMIN CREAT RATIO URINE (LAB COLLECT); Future  - Subsequent Annual Wellness Visit - Includes PPPS ()    3. Essential hypertension  Well-controlled. Labs as indicated. Continue antihypertensive medications. Discussed decreasing salt intake. Emphasized benefits of exercise and diet. Continue to monitor.    - Subsequent Annual Wellness Visit - Includes PPPS ()    4. Dyslipidemia  Chronic problem.  Patient is not on a statin medication.  Lab work has been ordered to further evaluate patient.  Patient will follow-up in 1 month to review lab work results.    - Lipid Profile; Future  - Subsequent Annual Wellness Visit - Includes PPPS ()    5. History of uterine cancer  Historical.  Patient is asymptomatic.  Continue to monitor.    - Subsequent Annual Wellness Visit - Includes PPPS ()    6. Gastroparesis due to DM (HCC)  Chronic but stable problem.  Continue current medication regimen.  Continue to monitor.    - Subsequent Annual Wellness Visit - Includes PPPS ()    7. Severe episode of recurrent major depressive disorder, without psychotic features (HCC)  Patient is not on medication for symptoms.  We will discuss in more detail during follow-up 0.1-month.  She denies homicidal or suicidal ideation.  Admits her diet could improve.  States she does not have a regular exercise routine.  Denies any suicidal or homicidal ideation. Emphasized importance of healthy diet and exercise. Discussed that should the patient have any symptoms they should call suicide prevention hotline or report to the emergency room immediately.    - Patient has been identified as having a positive depression screening. Appropriate orders and counseling have been given.  - Subsequent Annual Wellness Visit - Includes PPPS ()    8. Severe nonproliferative diabetic retinopathy of both eyes associated with type 2 diabetes mellitus, macular edema presence unspecified (HCC)  Continue following up with optometry as  indicated.  Continue injections.  Discussed importance of getting diabetes controlled.  Discussed importance of eating healthy diet regular having a regular exercise routine.  Continue to monitor.    - Subsequent Annual Wellness Visit - Includes PPPS ()    9. Multiple thyroid nodules  Thyroid nodules have not been evaluated in several years.  Ultrasound has been ordered to reevaluate thyroid nodules.  She tells me that she is asymptomatic.  Patient will follow-up in 1 month to review imaging and lab work results.    - US-SOFT TISSUES OF HEAD - NECK; Future  - TSH WITH REFLEX TO FT4; Future  - Subsequent Annual Wellness Visit - Includes PPPS ()    10. Microalbuminuria  Diabetes is uncontrolled.  Lab work has been ordered to further evaluate patient.  Patient will follow-up in 1 month to review lab work results.    - MICROALBUMIN CREAT RATIO URINE (LAB COLLECT); Future  - Subsequent Annual Wellness Visit - Includes PPPS ()    11. Obesity (BMI 35.0-39.9 without comorbidity)  Discussed importance of healthy diet regular exercise routine with patient.  - Patient identified as having weight management issue.  Appropriate orders and counseling given.  - Subsequent Annual Wellness Visit - Includes PPPS ()    12. Screening for colon cancer  Patient declined colonoscopy but agreed to Cologuard.    - COLOGUARD (FIT DNA)  - Subsequent Annual Wellness Visit - Includes PPPS ()    13. Need for vaccination  A flu vaccination was administered to patient without complication. A VIS form was provided to patient.     - INFLUENZA VACCINE, HIGH DOSE (65+ ONLY)  - Subsequent Annual Wellness Visit - Includes PPPS ()    14. Mild intermittent asthma without complication  Chronic but stable problem.  Uses albuterol inhaler as needed.  No acute flares.  Seasonal changes and smoke exacerbate symptoms.  Continue to monitor.    Advised patient if she ever develops shortness of breath not alleviated with medication to  seek me emergency care.  - Subsequent Annual Wellness Visit - Includes PPPS ()      15. Chronic bilateral low back pain without sciatica  This is a chronic ongoing problem.  She tells me that she has a PCP that she follows up in [place] in regards to chronic low back pain.  Per chart review during her last appointment patient was requesting OxyContin for pain symptoms.  She tells me occasionally she will take medication but only on an as-needed basis.  States she does not want to become addicted to the medication.  I have never filled this medication for patient.  Advised patient continue working on diet, exercise, normalizing body weight.  Patient does not want to seek further treatment at this time.  Patient declined physical therapy.    - Patient identified as fall risk.  Appropriate orders and counseling given.  - Subsequent Annual Wellness Visit - Includes PPPS ()    16. Chronic pain of both knees  Same as # 15.  Patient is using a cane.  - Patient identified as fall risk.  Appropriate orders and counseling given.  - Subsequent Annual Wellness Visit - Includes PPPS ()    17. PVD (peripheral vascular disease) (HCC)  Chronic but stable problem.  No further work-up indicated.  - Subsequent Annual Wellness Visit - Includes PPPS ()    19. Encounter for screening mammogram for breast cancer  Discussed importance of being screened for breast cancer with patient.  Patient is agreed to mammogram.  - MA-SCREEN MAMMO W/CAD-BILAT; Future  - Subsequent Annual Wellness Visit - Includes PPPS ()    20. Stage 3 chronic kidney disease (HCC)  This is a chronic but stable problem.  Avoid chronic NSAID use.  Continue work on hydration.  Continue to monitor.    - CBC WITH DIFFERENTIAL; Future  - Comp Metabolic Panel; Future  - Subsequent Annual Wellness Visit - Includes PPPS ()    1. Medicare annual wellness visit, subsequent  Subsequent Annual Wellness Visit - Includes PPPS ()   2. Type 2  diabetes mellitus with diabetic peripheral angiopathy without gangrene, without long-term current use of insulin (HCC)  Comp Metabolic Panel    HEMOGLOBIN A1C    Lipid Profile    MICROALBUMIN CREAT RATIO URINE (LAB COLLECT)    Subsequent Annual Wellness Visit - Includes PPPS ()   3. Essential hypertension  Subsequent Annual Wellness Visit - Includes PPPS ()   4. Dyslipidemia  Comp Metabolic Panel    HEMOGLOBIN A1C    CBC WITH DIFFERENTIAL    Subsequent Annual Wellness Visit - Includes PPPS ()   5. History of uterine cancer  Subsequent Annual Wellness Visit - Includes PPPS ()   6. Gastroparesis due to DM (HCC)  Subsequent Annual Wellness Visit - Includes PPPS ()   7. Severe episode of recurrent major depressive disorder, without psychotic features (HCC)  Patient has been identified as having a positive depression screening. Appropriate orders and counseling have been given.    Subsequent Annual Wellness Visit - Includes PPPS ()   8. Severe nonproliferative diabetic retinopathy of both eyes associated with type 2 diabetes mellitus, macular edema presence unspecified (HCC)  Subsequent Annual Wellness Visit - Includes PPPS ()   9. Multiple thyroid nodules  US-SOFT TISSUES OF HEAD - NECK    TSH WITH REFLEX TO FT4    Subsequent Annual Wellness Visit - Includes PPPS ()   10. Microalbuminuria  MICROALBUMIN CREAT RATIO URINE (LAB COLLECT)    Subsequent Annual Wellness Visit - Includes PPPS ()   11. Obesity (BMI 35.0-39.9 without comorbidity)  Patient identified as having weight management issue.  Appropriate orders and counseling given.    Subsequent Annual Wellness Visit - Includes PPPS ()   12. Screening for colon cancer  COLOGUARD (FIT DNA)    Subsequent Annual Wellness Visit - Includes PPPS ()   13. Need for vaccination  INFLUENZA VACCINE, HIGH DOSE (65+ ONLY)    Subsequent Annual Wellness Visit - Includes PPPS ()   14. Mild intermittent asthma without  complication  Subsequent Annual Wellness Visit - Includes PPPS ()   15. Chronic bilateral low back pain without sciatica  Patient identified as fall risk.  Appropriate orders and counseling given.    Subsequent Annual Wellness Visit - Includes PPPS ()   16. Chronic pain of both knees  Patient identified as fall risk.  Appropriate orders and counseling given.    Subsequent Annual Wellness Visit - Includes PPPS ()   17. PVD (peripheral vascular disease) (HCC)  Subsequent Annual Wellness Visit - Includes PPPS ()   18. Encounter for screening mammogram for breast cancer  MA-SCREEN MAMMO W/CAD-BILAT    Subsequent Annual Wellness Visit - Includes PPPS ()   19. Stage 3 chronic kidney disease (HCC)  Comp Metabolic Panel    CBC WITH DIFFERENTIAL    Subsequent Annual Wellness Visit - Includes PPPS ()         Services suggested: No services needed at this time  Health Care Screening: Age-appropriate preventive services recommended by USPTF and ACIP covered by Medicare were discussed today. Services ordered if indicated and agreed upon by the patient.  Referrals offered: Community-based lifestyle interventions to reduce health risks and promote self-management and wellness, fall prevention, nutrition, physical activity, tobacco-use cessation, weight loss, and mental health services as per orders if indicated.    Discussion today about general wellness and lifestyle habits:    · Prevent falls and reduce trip hazards; Cautioned about securing or removing rugs.  · Have a working fire alarm and carbon monoxide detector;   · Engage in regular physical activity and social activities     Follow-up: Return in about 1 month (around 10/20/2019).

## 2019-09-20 NOTE — TELEPHONE ENCOUNTER
Was the patient seen in the last year in this department? Yes    Does patient have an active prescription for medications requested? Yes    Received Request Via: Pharmacy     Pharmacy requesting a 90 days supply    Future Appointments       Provider Department Center    9/20/2019 2:00 PM Mili Graham P.A.-C.; [facility] - [address]

## 2019-09-25 ENCOUNTER — OFFICE VISIT (OUTPATIENT)
Dept: MEDICAL GROUP | Facility: PHYSICIAN GROUP | Age: 69
End: 2019-09-25
Payer: MEDICARE

## 2019-09-25 VITALS
OXYGEN SATURATION: 92 % | BODY MASS INDEX: 39.35 KG/M2 | HEART RATE: 70 BPM | TEMPERATURE: 98.6 F | WEIGHT: 213.85 LBS | DIASTOLIC BLOOD PRESSURE: 90 MMHG | HEIGHT: 62 IN | SYSTOLIC BLOOD PRESSURE: 170 MMHG

## 2019-09-25 DIAGNOSIS — I10 ESSENTIAL HYPERTENSION: ICD-10-CM

## 2019-09-25 DIAGNOSIS — S81.801A WOUND OF RIGHT LOWER EXTREMITY, INITIAL ENCOUNTER: ICD-10-CM

## 2019-09-25 DIAGNOSIS — L30.9 ECZEMA, UNSPECIFIED TYPE: ICD-10-CM

## 2019-09-25 PROCEDURE — 99214 OFFICE O/P EST MOD 30 MIN: CPT | Performed by: FAMILY MEDICINE

## 2019-09-25 RX ORDER — TRIAMCINOLONE ACETONIDE 1 MG/G
CREAM TOPICAL
Qty: 60 G | Refills: 1 | Status: SHIPPED | OUTPATIENT
Start: 2019-09-25

## 2019-09-25 NOTE — PROGRESS NOTES
Subjective:      Jessica Cueto is a 69 y.o. female who presents with Rash (on legs). Her current PCP is Mili Graham P.A.-C. who is currently out of the office.        HPI:    Eczema, unspecified type  She went to urgent care on 8/30/2019 where she was found to have a yeast infection on the left lateral skin fold of her lower abdomen. She was prescribed Diflucan p.o. for 7 days and nystatin cream which appeared to clear her symptoms. She believes the same rash returned earlier today. It is mostly present on her abdomen, upper arms, and breasts.  She currently has rash on her arms, legs and abdomen and these areas are very pruritic and dry; she has been scratching the area. She believes she caught it from her dog who had bilateral bacterial and yeast ear infections.  In the urgent care she was also treated for dry skin dermatitis and was given hydroxyzine for the itching.    Wound of right lower extremity, initial encounter  She also notes that about 3 years ago, she had an achilles tendon repair surgery. She has a recurring wound in this area, and she states that this frequently splits open and then heals back over in about 2 weeks through her own efforts. She last saw wound care about 3 years ago. Today, she notes the wound has been split open for about 1 week. She has been cleaning the areas with betadine solution. She would not like a referral back to wound care due to cost constraints.     Essential Hypertension  She takes Losartan 100 mg and HCTZ 25 mg for her hypertension without any side effects. Blood pressure in the office today is elevated at 170/90.       Diabetes mellitus type 2  Patient has uncontrolled diabetes mellitus type 2.  She has not had a recent hemoglobin A1c with the last one at 10.4.  She said she will go for blood work in the next few days.  She is on metformin 1000 mg twice a day.    Past medical history, past surgical history, family history reviewed-no changes    Social history  "reviewed-no changes    Allergies reviewed-no changes    Medications reviewed-no changes      ROS:  As per the HPI as shown above, the rest are negative.       Objective:     BP (!) 170/90 (BP Location: Left arm, Patient Position: Sitting, BP Cuff Size: Adult)   Pulse 70   Temp 37 °C (98.6 °F) (Temporal)   Ht 1.575 m (5' 2\")   Wt 97 kg (213 lb 13.5 oz)   SpO2 92%   BMI 39.11 kg/m²     Physical Exam     Examined alert, awake, oriented, not in distress    Neck-supple, no lymphadenopathy, no thyromegaly  Lungs-clear to auscultation, no rales, no wheezes  Heart-regular rate and rhythm, no murmur  Extremities-no edema, clubbing, cyanosis   Skin-erythematous and dry excoriations on the arms and all over the abdomen consistent with eczema. Rash in the lower abdomen skin fold is already completely healed and only has residual dryness of skin. 2 cm horizontal gaping wound across the achilles tendon area on the right side with no discharge or signs of infection.    Assessment/Plan:     1. Eczema, unspecified type  Patient's physical exam is more indicative of eczema rather than a yeast infection. I will be prescribing her Kenalog cream to apply thinly to the affected areas. I will not be giving her PO steroids because of her diabetes. Once the rash has resolved, she should keep the areas moisturized with Aquaphor. I advised her to take Benadryl for the pruritus. I advised her to take short, lukewarm preferably cold showers and avoid taking tub baths.   - triamcinolone acetonide (KENALOG) 0.1 % Cream; Apply thinly to all affected areas on the arms, legs and trunk twice daily.  Dispense: 60 g; Refill: 1    2. Wound of right lower extremity, initial encounter  Strongly recommended referral to wound care center but she declined because of cost.  She said she can make this heal at home with her own efforts which she has done in the past.  The area does not appear to be infected at this time. I advised her to stay away from " using irritating solutions such as betadine and clean the area with saline solution. She should keep the area covered with a bandage. She will follow-up with her PCP in one month. I advised her to return if it begins to appear infected.     3.  Essential hypertension   Blood pressures elevated this visit but was running good when she saw Mili Graham PA-C last week.  She will follow-up with Mili next month and this will be rechecked at that time.  Continue current medication which is losartan 100 mg and hydrochlorothiazide 25 mg daily.     4.  Uncontrolled diabetes mellitus type 2  Patient declined POCT hemoglobin A1c.  She said she will get her blood work done in the next few days ordered by Mili Graham PA-C.    Johann BOWMAN (Scribe), am scribing for, and in the presence of, Nora Mcmillan MD     Electronically signed by: Johann Rai (Scribe), 9/25/2019    Nora BOWMAN MD personally performed the services described in this documentation, as scribed by Johann Rai in my presence, and it is both accurate and complete.

## 2019-10-23 ENCOUNTER — APPOINTMENT (OUTPATIENT)
Dept: MEDICAL GROUP | Facility: PHYSICIAN GROUP | Age: 69
End: 2019-10-23
Payer: MEDICARE

## 2019-11-05 ENCOUNTER — PATIENT OUTREACH (OUTPATIENT)
Dept: HEALTH INFORMATION MANAGEMENT | Facility: OTHER | Age: 69
End: 2019-11-05

## 2019-11-05 NOTE — PROGRESS NOTES
Attempt #1 SCP Intro needed & AHA      Called pt to sched fv appt with PCP. Pt requested a call back for next wednesday

## 2019-11-14 NOTE — PROGRESS NOTES
Outcome: Left Message- SCP intro needed & A1C & AHA     Please transfer to Patient Outreach Team at [phone] when patient returns call.    [facility] Verified: yes    Attempt # 2

## 2019-12-10 DIAGNOSIS — E11.51 TYPE 2 DIABETES MELLITUS WITH DIABETIC PERIPHERAL ANGIOPATHY WITHOUT GANGRENE, WITHOUT LONG-TERM CURRENT USE OF INSULIN (HCC): ICD-10-CM

## 2019-12-10 NOTE — TELEPHONE ENCOUNTER
Phone Number Called: [phone] (home)     Call outcome: left message for patient to call back regarding message below    Message: lvm for patient to make appointment for refills

## 2019-12-16 DIAGNOSIS — E78.5 DYSLIPIDEMIA: ICD-10-CM

## 2019-12-17 RX ORDER — ATORVASTATIN CALCIUM 20 MG/1
TABLET, FILM COATED ORAL
Qty: 90 TAB | Refills: 0 | Status: SHIPPED | OUTPATIENT
Start: 2019-12-17 | End: 2020-05-18 | Stop reason: SDUPTHER

## 2020-01-10 DIAGNOSIS — E11.51 TYPE 2 DIABETES MELLITUS WITH DIABETIC PERIPHERAL ANGIOPATHY WITHOUT GANGRENE, WITHOUT LONG-TERM CURRENT USE OF INSULIN (HCC): ICD-10-CM

## 2020-01-10 RX ORDER — GLIMEPIRIDE 4 MG/1
TABLET ORAL
Qty: 100 TAB | Refills: 0 | Status: SHIPPED | OUTPATIENT
Start: 2020-01-10

## 2020-04-13 ENCOUNTER — TELEPHONE (OUTPATIENT)
Dept: HEALTH INFORMATION MANAGEMENT | Facility: OTHER | Age: 70
End: 2020-04-13

## 2020-04-13 NOTE — TELEPHONE ENCOUNTER
1. Caller Name: Jessica                          Call Back Number: [phone]     Pt is calling from [facility].     [facility] PCP or Specialty Provider: Yes Mili Graham P.A.-C.        2.  Does patient have any active symptoms of respiratory illness (fever OR cough OR shortness of breath OR sore throat)? Yes, the patient reports the following respiratory symptoms: shortness of breath.   Pt feels its an asthma attack.  She is preparing her nebulizer as we speak.       No fevers, no cough, no sore throat.  Pt feels like she is having a Asthma Attack.  She is doing her nebulizer while on the phone.  O2 is at 92% with her pulse Ox.  During the phone call she states she is feeling better, no gasping for air.  Pt understands to call 911 if symptoms persist or get worse.      3.  Does patient have any comoribidities? COPD    4.  Has the patient traveled in the last 14 days OR had any known contact with someone who is suspected or confirmed to have COVID-19?  Yes, Pt is in [facility], traveling for work.      Discussed with Dr Aleman.  MD said it is OK to do her nebulizer every 4 hours PRN for symptoms.  Recommended seeing UC visit and maybe being put on steroids.      5. POTENTIAL PUI (MODERATE):  Pt directed to go to UC/ER or call 911 if she becomes hypoxic .  Pt will set up my chart, I gave [employer] Help Line number, and a virtual visit with [facility] or provider.       Note routed to [facility] Provider: Provider action needed: Needs refill of rescue inhaler.  Pt told to call 911 if symptoms get worse or O2 sats drop below 90%.  Talked to Da with scheduling and he is setting up a consult with [facility].

## 2020-04-13 NOTE — PROGRESS NOTES
Called maryuri to assist on setting up Mychart and also info on UC virtual visit. Maryuri stated she would call me tomorrow to set everything up since she is in [place] right now and stated it is already late. Provided my direct ph #  so she can give me a call back

## 2020-04-14 ENCOUNTER — OFFICE VISIT (OUTPATIENT)
Dept: MEDICAL GROUP | Facility: PHYSICIAN GROUP | Age: 70
End: 2020-04-14
Payer: MEDICARE

## 2020-04-14 VITALS — OXYGEN SATURATION: 93 % | TEMPERATURE: 97 F | BODY MASS INDEX: 39.11 KG/M2 | HEIGHT: 62 IN | HEART RATE: 83 BPM

## 2020-04-14 DIAGNOSIS — R06.02 SOB (SHORTNESS OF BREATH): ICD-10-CM

## 2020-04-14 PROCEDURE — 99442 PR PHYSICIAN TELEPHONE EVALUATION 11-20 MIN: CPT | Mod: CS | Performed by: PHYSICIAN ASSISTANT

## 2020-04-14 RX ORDER — ALBUTEROL SULFATE 90 UG/1
2 AEROSOL, METERED RESPIRATORY (INHALATION) EVERY 6 HOURS PRN
Qty: 8.5 G | Refills: 3 | Status: SHIPPED | OUTPATIENT
Start: 2020-04-14

## 2020-04-14 RX ORDER — AZITHROMYCIN 250 MG/1
TABLET, FILM COATED ORAL
Qty: 6 TAB | Refills: 0 | Status: SHIPPED | OUTPATIENT
Start: 2020-04-14

## 2020-04-14 NOTE — TELEPHONE ENCOUNTER
Future Appointments       Provider Department [facility]    4/14/2020 1:40 PM Mili Graham P.A.-C. [facility] - [address]

## 2020-04-28 ENCOUNTER — PATIENT OUTREACH (OUTPATIENT)
Dept: HEALTH INFORMATION MANAGEMENT | Facility: OTHER | Age: 70
End: 2020-04-28

## 2020-04-29 NOTE — PROGRESS NOTES
Outcome: Left Message- SCP PA & AHA    Please transfer to Patient Outreach Team at [phone] when patient returns call.    [facility] Verified: yes    Attempt # 3

## 2020-04-30 NOTE — PROGRESS NOTES
Telephone Appointment Visit   As a means of avoiding spread of COVID-19, this visit is being conducted by telephone. This telephone visit was initiated by the patient and they verbally consented.    Time at start of call: 2:12 pm    Reason for Call:  Symptom Follow-up    Patient Comments / History:   SOB (shortness of breath)  Patient is a pleasant 69-year-old female here today to discuss shortness of breath.  She tells me 4 days ago she flew from [place] to [place].  States for the past 2 days she has been experiencing shortness of breath.  States her O2 sat 4 days ago was 84%.  States today O2 is from 89-93%.  She denies fever, chills, nausea, vomiting, wheezing. States she is experiencing chest tightness mainly on the left side.  She tells me that she has been using nebulizer treatments every 4 hours.  Patient is requesting refill for rescue inhaler.  Patient is unsure if she has been in contact with someone ill with COVID-19.    Patient mentions that she is also been experiencing 1-2+ pitting edema of bilateral lower extremities.  Patient is inquiring about treatment options.  States she is unable to follow-up with the urgent care because they turn them into testing centers for COVID-19.  Patient states she will be moving to [facility] in 3 months and does not have a primary care provider in [place].    - albuterol 108 (90 Base) MCG/ACT Aero Soln inhalation aerosol; Inhale 2 Puffs by mouth every 6 hours as needed for Shortness of Breath.  Dispense: 8.5 g; Refill: 3  - azithromycin (ZITHROMAX) 250 MG Tab; Take two tablets by mouth on day 1 and then 1 tablet by mouth on days 2-5.  Dispense: 6 Tab; Refill: 0       Labs / Images Reviewed       Assessment and Plan:     1. SOB (shortness of breath)  Patient has been prescribed azithromycin to 50 mg tablets take 2 tabs by mouth on day 1 and then 1 tablet by mouth on days 2 through 5.  Suggested patient take over-the-counter probiotics when taking antibiotics  due to digital gastrointestinal symptoms associated with antibiotic use.  Refilled patient's albuterol inhaler.  Advised patient continue using nebulizer treatments.  Highly emphasized importance for patient to be seen at an urgent care or emergency room if O2 saturations drop and or if symptoms worsen.  Discussed with patient I am unable to rule out COVID-19 at this time.  Once again emphasized importance of following up for urgent treatment if symptoms worsen.  Patient is a high risk patient for COVID-19.    - albuterol 108 (90 Base) MCG/ACT Aero Soln inhalation aerosol; Inhale 2 Puffs by mouth every 6 hours as needed for Shortness of Breath.  Dispense: 8.5 g; Refill: 3  - azithromycin (ZITHROMAX) 250 MG Tab; Take two tablets by mouth on day 1 and then 1 tablet by mouth on days 2-5.  Dispense: 6 Tab; Refill: 0      Follow-up: Return if symptoms worsen or fail to improve.    Time at end of call: 2:26 pm  Total Time Spent: 11-20 minutes    Mili Graham P.A.-C.

## 2020-05-18 DIAGNOSIS — E78.5 DYSLIPIDEMIA: ICD-10-CM

## 2020-05-18 RX ORDER — CARVEDILOL 6.25 MG/1
6.25 TABLET ORAL 2 TIMES DAILY
COMMUNITY
Start: 2020-04-27 | End: 2020-05-18 | Stop reason: SDUPTHER

## 2020-05-18 RX ORDER — SITAGLIPTIN 50 MG/1
50 TABLET, FILM COATED ORAL DAILY
COMMUNITY
Start: 2020-04-27 | End: 2020-05-18 | Stop reason: SDUPTHER

## 2020-05-18 RX ORDER — FUROSEMIDE 20 MG/1
20 TABLET ORAL DAILY
COMMUNITY
Start: 2020-04-27 | End: 2020-05-18 | Stop reason: SDUPTHER

## 2020-05-18 NOTE — TELEPHONE ENCOUNTER
VOICEMAIL  1. Caller Name: Jessica Cueto                        Call Back Number: [phone] (home)       2. Message: Asked for a C/B

## 2020-05-18 NOTE — TELEPHONE ENCOUNTER
Received request via: Patient    Was the patient seen in the last year in this department? Yes    Does the patient have an active prescription (recently filled or refills available) for medication(s) requested? No     Hospital informed her PCP would need to reorder these meds.

## 2020-05-18 NOTE — TELEPHONE ENCOUNTER
Phone Number Called: [phone] (home)       Call outcome: Spoke to patient regarding message below.    Message: Pt wanted to inform PCP, now out of hospital.  Her diagnosis was Congestive Heart Failure, and Pneumonia (five lungs).  She also wanted to inform PCP now on oxygen and a baby aspirin daily.  She will be leaving [place] soon, but will continue under PCP care until 06/01/2020.  Needed to request refill of her meds.

## 2020-05-19 RX ORDER — CARVEDILOL 6.25 MG/1
6.25 TABLET ORAL 2 TIMES DAILY
Qty: 60 TAB | Refills: 0 | Status: SHIPPED | OUTPATIENT
Start: 2020-05-19 | End: 2020-06-10

## 2020-05-19 RX ORDER — ATORVASTATIN CALCIUM 20 MG/1
TABLET, FILM COATED ORAL
Qty: 90 TAB | Refills: 0 | Status: SHIPPED | OUTPATIENT
Start: 2020-05-19

## 2020-05-19 RX ORDER — SITAGLIPTIN 50 MG/1
50 TABLET, FILM COATED ORAL DAILY
Qty: 30 TAB | Refills: 0 | Status: SHIPPED | OUTPATIENT
Start: 2020-05-19 | End: 2020-06-10

## 2020-05-19 RX ORDER — FUROSEMIDE 20 MG/1
20 TABLET ORAL DAILY
Qty: 60 TAB | Refills: 0 | Status: SHIPPED | OUTPATIENT
Start: 2020-05-19

## 2020-05-28 ENCOUNTER — PATIENT OUTREACH (OUTPATIENT)
Dept: HEALTH INFORMATION MANAGEMENT | Facility: OTHER | Age: 70
End: 2020-05-28

## 2020-06-10 RX ORDER — SITAGLIPTIN 50 MG/1
TABLET, FILM COATED ORAL
Qty: 30 TAB | Refills: 0 | Status: SHIPPED | OUTPATIENT
Start: 2020-06-10

## 2020-06-10 RX ORDER — CARVEDILOL 6.25 MG/1
TABLET ORAL
Qty: 60 TAB | Refills: 0 | Status: SHIPPED | OUTPATIENT
Start: 2020-06-10

## 2021-01-15 DIAGNOSIS — Z23 NEED FOR VACCINATION: ICD-10-CM

## (undated) DEVICE — SLEEVE, VASO, THIGH, MED

## (undated) DEVICE — PAD EYE GAUZE COVERED OVAL 1 5/8 X 2 5/8" STERILE"

## (undated) DEVICE — CANNULA DIVIDED ADULT CO2 - SAMPLE W/FEMALE CONNCT (25/CA)

## (undated) DEVICE — TUBING CLEARLINK DUO-VENT - C-FLO (48EA/CA)

## (undated) DEVICE — SET LEADWIRE 5 LEAD BEDSIDE DISPOSABLE ECG (1SET OF 5/EA)

## (undated) DEVICE — SUCTION INSTRUMENT YANKAUER BULBOUS TIP W/O VENT (50EA/CA)

## (undated) DEVICE — SYRINGE SAFETY 10 ML 18 GA X 1 1/2 BLUNT LL (100/BX 4BX/CA)

## (undated) DEVICE — NEEDLE FILTER ASPIRATION 18 GA X 1 1/2 IN (100EA/BX)

## (undated) DEVICE — CATHETER IV 20 GA X 1-1/4 ---SURG.& SDS ONLY--- (50EA/BX)

## (undated) DEVICE — CANISTER SUCTION RIGID RED 1500CC (40EA/CA)

## (undated) DEVICE — CANISTER SUCTION 3000ML MECHANICAL FILTER AUTO SHUTOFF MEDI-VAC NONSTERILE LF DISP  (40EA/CA)

## (undated) DEVICE — MANIFOLD NEPTUNE 1 PORT (20/PK)

## (undated) DEVICE — PROBE 23 GA ILLUM FLEX CURVED - LASER(6/BX)

## (undated) DEVICE — SENSOR SPO2 NEO LNCS ADHESIVE (20/BX) SEE USER NOTES

## (undated) DEVICE — KIT ANESTHESIA W/CIRCUIT & 3/LT BAG W/FILTER (20EA/CA)

## (undated) DEVICE — TUBE SHILEY ENDOTRACHEAL ORAL RAE CUFFED 7.0MM WITH TAPERGUARD (10EA/PK)

## (undated) DEVICE — NEPTUNE 4 PORT MANIFOLD - (20/PK)

## (undated) DEVICE — SODIUM CHL IRRIGATION 0.9% 1000ML (12EA/CA)

## (undated) DEVICE — GLOVE SZ 7.5 BIOGEL PI MICRO - PF LF (50PR/BX)

## (undated) DEVICE — GLOVE SZ 7 BIOGEL PI MICRO - PF LF (50PR/BX 4BX/CA)

## (undated) DEVICE — ELECTRODE 850 FOAM ADHESIVE - HYDROGEL RADIOTRNSPRNT (50/PK)

## (undated) DEVICE — MASK ANESTHESIA ADULT  - (100/CA)

## (undated) DEVICE — WATER IRRIGATION STERILE 1000ML (12EA/CA)

## (undated) DEVICE — SYRINGE DISP. 60 CC LL - (30/BX, 12BX/CA)**WHEN THESE ARE GONE ORDER #500206**

## (undated) DEVICE — CANNULA INJECTION 27G (EYE) - 10/BX ALCON

## (undated) DEVICE — PROTECTOR ULNA NERVE - (36PR/CA)

## (undated) DEVICE — SHIELD OPTH AL GRTR CVR FOX (50EA/BX)

## (undated) DEVICE — GLOVE BIOGEL SZ 7.5 SURGICAL PF LTX - (50PR/BX 4BX/CA)

## (undated) DEVICE — LACTATED RINGERS INJ 1000 ML - (14EA/CA 60CA/PF)

## (undated) DEVICE — KIT  I.V. START (100EA/CA)

## (undated) DEVICE — SUTURE EYE